# Patient Record
Sex: MALE | Race: BLACK OR AFRICAN AMERICAN | NOT HISPANIC OR LATINO | URBAN - METROPOLITAN AREA
[De-identification: names, ages, dates, MRNs, and addresses within clinical notes are randomized per-mention and may not be internally consistent; named-entity substitution may affect disease eponyms.]

---

## 2017-11-22 ENCOUNTER — EMERGENCY (EMERGENCY)
Facility: HOSPITAL | Age: 37
LOS: 1 days | Discharge: HOME | End: 2017-11-22
Attending: EMERGENCY MEDICINE
Payer: MEDICAID

## 2017-11-22 VITALS
DIASTOLIC BLOOD PRESSURE: 93 MMHG | WEIGHT: 300.05 LBS | SYSTOLIC BLOOD PRESSURE: 140 MMHG | OXYGEN SATURATION: 100 % | HEART RATE: 120 BPM | TEMPERATURE: 100 F | HEIGHT: 71 IN | RESPIRATION RATE: 20 BRPM

## 2017-11-22 VITALS
DIASTOLIC BLOOD PRESSURE: 67 MMHG | SYSTOLIC BLOOD PRESSURE: 145 MMHG | RESPIRATION RATE: 20 BRPM | HEART RATE: 101 BPM | OXYGEN SATURATION: 98 % | TEMPERATURE: 100 F

## 2017-11-22 DIAGNOSIS — Z96.651 PRESENCE OF RIGHT ARTIFICIAL KNEE JOINT: Chronic | ICD-10-CM

## 2017-11-22 LAB
ALBUMIN SERPL ELPH-MCNC: 3 G/DL — LOW (ref 3.5–5)
ALP SERPL-CCNC: 67 U/L — SIGNIFICANT CHANGE UP (ref 40–120)
ALT FLD-CCNC: 39 U/L DA — SIGNIFICANT CHANGE UP (ref 10–60)
ANION GAP SERPL CALC-SCNC: 6 MMOL/L — SIGNIFICANT CHANGE UP (ref 5–17)
AST SERPL-CCNC: 29 U/L — SIGNIFICANT CHANGE UP (ref 10–40)
BILIRUB SERPL-MCNC: 0.7 MG/DL — SIGNIFICANT CHANGE UP (ref 0.2–1.2)
BUN SERPL-MCNC: 15 MG/DL — SIGNIFICANT CHANGE UP (ref 7–18)
CALCIUM SERPL-MCNC: 9 MG/DL — SIGNIFICANT CHANGE UP (ref 8.4–10.5)
CHLORIDE SERPL-SCNC: 106 MMOL/L — SIGNIFICANT CHANGE UP (ref 96–108)
CO2 SERPL-SCNC: 27 MMOL/L — SIGNIFICANT CHANGE UP (ref 22–31)
CREAT SERPL-MCNC: 1.16 MG/DL — SIGNIFICANT CHANGE UP (ref 0.5–1.3)
D DIMER BLD IA.RAPID-MCNC: 1039 NG/ML DDU — HIGH
GLUCOSE SERPL-MCNC: 151 MG/DL — HIGH (ref 70–99)
HCT VFR BLD CALC: 36 % — LOW (ref 39–50)
HGB BLD-MCNC: 11.5 G/DL — LOW (ref 13–17)
MCHC RBC-ENTMCNC: 28.1 PG — SIGNIFICANT CHANGE UP (ref 27–34)
MCHC RBC-ENTMCNC: 31.9 GM/DL — LOW (ref 32–36)
MCV RBC AUTO: 88.1 FL — SIGNIFICANT CHANGE UP (ref 80–100)
NT-PROBNP SERPL-SCNC: 6 PG/ML — SIGNIFICANT CHANGE UP (ref 0–125)
PLATELET # BLD AUTO: 371 K/UL — SIGNIFICANT CHANGE UP (ref 150–400)
POTASSIUM SERPL-MCNC: 3.8 MMOL/L — SIGNIFICANT CHANGE UP (ref 3.5–5.3)
POTASSIUM SERPL-SCNC: 3.8 MMOL/L — SIGNIFICANT CHANGE UP (ref 3.5–5.3)
PROT SERPL-MCNC: 7.4 G/DL — SIGNIFICANT CHANGE UP (ref 6–8.3)
RBC # BLD: 4.08 M/UL — LOW (ref 4.2–5.8)
RBC # FLD: 13.1 % — SIGNIFICANT CHANGE UP (ref 10.3–14.5)
SODIUM SERPL-SCNC: 139 MMOL/L — SIGNIFICANT CHANGE UP (ref 135–145)
WBC # BLD: 8 K/UL — SIGNIFICANT CHANGE UP (ref 3.8–10.5)
WBC # FLD AUTO: 8 K/UL — SIGNIFICANT CHANGE UP (ref 3.8–10.5)

## 2017-11-22 PROCEDURE — 80053 COMPREHEN METABOLIC PANEL: CPT

## 2017-11-22 PROCEDURE — 83880 ASSAY OF NATRIURETIC PEPTIDE: CPT

## 2017-11-22 PROCEDURE — 93971 EXTREMITY STUDY: CPT

## 2017-11-22 PROCEDURE — 85379 FIBRIN DEGRADATION QUANT: CPT

## 2017-11-22 PROCEDURE — 93005 ELECTROCARDIOGRAM TRACING: CPT

## 2017-11-22 PROCEDURE — 84484 ASSAY OF TROPONIN QUANT: CPT

## 2017-11-22 PROCEDURE — 82550 ASSAY OF CK (CPK): CPT

## 2017-11-22 PROCEDURE — 85027 COMPLETE CBC AUTOMATED: CPT

## 2017-11-22 PROCEDURE — 71275 CT ANGIOGRAPHY CHEST: CPT | Mod: 26

## 2017-11-22 PROCEDURE — 71275 CT ANGIOGRAPHY CHEST: CPT

## 2017-11-22 PROCEDURE — 93971 EXTREMITY STUDY: CPT | Mod: 26,RT

## 2017-11-22 PROCEDURE — 82553 CREATINE MB FRACTION: CPT

## 2017-11-22 PROCEDURE — 96372 THER/PROPH/DIAG INJ SC/IM: CPT

## 2017-11-22 PROCEDURE — 99285 EMERGENCY DEPT VISIT HI MDM: CPT

## 2017-11-22 PROCEDURE — 99284 EMERGENCY DEPT VISIT MOD MDM: CPT | Mod: 25

## 2017-11-22 RX ORDER — OXYCODONE AND ACETAMINOPHEN 5; 325 MG/1; MG/1
1 TABLET ORAL ONCE
Qty: 0 | Refills: 0 | Status: DISCONTINUED | OUTPATIENT
Start: 2017-11-22 | End: 2017-11-22

## 2017-11-22 RX ORDER — CHLORPROMAZINE HCL 10 MG
1 TABLET ORAL
Qty: 9 | Refills: 0 | OUTPATIENT
Start: 2017-11-22 | End: 2017-11-25

## 2017-11-22 RX ORDER — CHLORPROMAZINE HCL 10 MG
25 TABLET ORAL ONCE
Qty: 0 | Refills: 0 | Status: COMPLETED | OUTPATIENT
Start: 2017-11-22 | End: 2017-11-22

## 2017-11-22 RX ORDER — ACETAMINOPHEN 500 MG
975 TABLET ORAL ONCE
Qty: 0 | Refills: 0 | Status: COMPLETED | OUTPATIENT
Start: 2017-11-22 | End: 2017-11-22

## 2017-11-22 RX ORDER — CHLORPROMAZINE HCL 10 MG
50 TABLET ORAL ONCE
Qty: 0 | Refills: 0 | Status: COMPLETED | OUTPATIENT
Start: 2017-11-22 | End: 2017-11-22

## 2017-11-22 RX ORDER — CHLORPROMAZINE HCL 10 MG
10 TABLET ORAL ONCE
Qty: 0 | Refills: 0 | Status: COMPLETED | OUTPATIENT
Start: 2017-11-22 | End: 2017-11-22

## 2017-11-22 RX ORDER — ENOXAPARIN SODIUM 100 MG/ML
130 INJECTION SUBCUTANEOUS ONCE
Qty: 0 | Refills: 0 | Status: COMPLETED | OUTPATIENT
Start: 2017-11-22 | End: 2017-11-22

## 2017-11-22 RX ADMIN — Medication 10 MILLIGRAM(S): at 18:18

## 2017-11-22 RX ADMIN — Medication 50 MILLIGRAM(S): at 22:28

## 2017-11-22 RX ADMIN — Medication 25 MILLIGRAM(S): at 19:35

## 2017-11-22 RX ADMIN — Medication 975 MILLIGRAM(S): at 20:59

## 2017-11-22 RX ADMIN — OXYCODONE AND ACETAMINOPHEN 1 TABLET(S): 5; 325 TABLET ORAL at 18:06

## 2017-11-22 RX ADMIN — Medication 975 MILLIGRAM(S): at 22:27

## 2017-11-22 RX ADMIN — ENOXAPARIN SODIUM 130 MILLIGRAM(S): 100 INJECTION SUBCUTANEOUS at 18:58

## 2017-11-22 RX ADMIN — OXYCODONE AND ACETAMINOPHEN 1 TABLET(S): 5; 325 TABLET ORAL at 18:45

## 2017-11-22 NOTE — ED PROVIDER NOTE - CONTEXT
Donnell Dub 35   Date:   12/2/2019     Name:   Emmanuel Slaughter    YOB: 1950   MRN:   [id number]       WHERE IS YOUR PAIN NOW? Sundeep the areas on your body where you feel the described sensations.   Use the appropriate exertion

## 2017-11-22 NOTE — ED PROVIDER NOTE - OBJECTIVE STATEMENT
36 M h/o obesity, osteoarthritis s/p right knee replacement 5 days ago came with dyspnea for last 2-3 days. Dyspnea was gradual in onset, progressive, worsens with exertion, associated with chest pressure, hiccups and regurgitations. Patient was discharged 3 days after surgery, he was ambulating with walker. Patient denies calf pain, cough, fever, nausea, vomiting, diarrhea, rash, fall, trauma, smoking, alcohol abuse and illicit drug use.

## 2017-11-22 NOTE — ED PROVIDER NOTE - ATTENDING CONTRIBUTION TO CARE
pt comes in with sob today s/p knee replacement , ct angio and labs    ct negative, febrile discussed with Dr Ren , will follow up with patient

## 2017-11-22 NOTE — ED ADULT NURSE NOTE - OBJECTIVE STATEMENT
pt is here for difficulty breathing.  c/o stomach pain 10/10 which was worst one but no pain at this time, denied N/V, no fever.  pt state "I had total right knee replacement since last Friday".

## 2019-01-03 PROBLEM — Z00.00 ENCOUNTER FOR PREVENTIVE HEALTH EXAMINATION: Status: ACTIVE | Noted: 2019-01-03

## 2019-01-15 ENCOUNTER — TRANSCRIPTION ENCOUNTER (OUTPATIENT)
Age: 39
End: 2019-01-15

## 2019-01-15 ENCOUNTER — APPOINTMENT (OUTPATIENT)
Dept: ORTHOPEDIC SURGERY | Facility: CLINIC | Age: 39
End: 2019-01-15
Payer: MEDICAID

## 2019-01-15 VITALS
HEIGHT: 72 IN | SYSTOLIC BLOOD PRESSURE: 149 MMHG | BODY MASS INDEX: 37.93 KG/M2 | DIASTOLIC BLOOD PRESSURE: 86 MMHG | WEIGHT: 280 LBS | HEART RATE: 71 BPM

## 2019-01-15 DIAGNOSIS — Q65.89 OTHER SPECIFIED CONGENITAL DEFORMITIES OF HIP: ICD-10-CM

## 2019-01-15 DIAGNOSIS — M17.12 UNILATERAL PRIMARY OSTEOARTHRITIS, LEFT KNEE: ICD-10-CM

## 2019-01-15 DIAGNOSIS — M25.562 PAIN IN LEFT KNEE: ICD-10-CM

## 2019-01-15 PROCEDURE — 99204 OFFICE O/P NEW MOD 45 MIN: CPT

## 2019-01-15 PROCEDURE — 73564 X-RAY EXAM KNEE 4 OR MORE: CPT | Mod: RT

## 2019-01-15 PROCEDURE — 73522 X-RAY EXAM HIPS BI 3-4 VIEWS: CPT

## 2019-01-15 NOTE — DISCUSSION/SUMMARY
[de-identified] : Long discussion was had with the patient I feel he would benefit from left total knee replacement.  Prior to procedure we will get his notes from [facility] find out why he had hiccups.  Also we will be aware of in the postoperative.  We will find out the anesthesia and I suggested he be seen as an anesthesia consult before the surgery.  Surgical risks.    A  long discussion was had with the patient as what the total joint replacement would entail.  A model was used to demonstrate the operation.  The hospitalization and rehabilitation were discussed.  He has a perioperative antibiotics and DVT prophylaxis were discussed.   The risks, benefits and alternatives to surgical intervention were discussed at length with the patient. Specific risks discussed included: infection, wound breakdown, numbness and damage to nerves, tendon, muscle, arteries or other blood vessels.  The possibility of recurrent pain, no improvement in pain and actual worsening of the pain were also mentioned in conversation with the patient. Medical complications related to the patient's general medical health including deep vein thrombosis, pulmonary embolus, heart attack, stroke, death and other complications from anesthesia were discussed as well. The patient was told that we will take steps to minimize these risks by using sterile technique, antibiotics and DVT prophylaxis when appropriate and following the patient postoperatively in the clinic setting to monitor progress. The benefits of surgery were discussed with the patient including the potential to improve the current clinical condition throughout operative intervention. Alternatives to surgical intervention include continued conservative management which may yield less than optimal results this particular patient. All questions were answered to the satisfaction of the patient.  The patient also has degenerative arthritis in his left hip and is dysplastic of the left hip which is symptomatic more than the right.  At this time will be followed conservatively.

## 2019-01-15 NOTE — PHYSICAL EXAM
[de-identified] : Constitutional - the patient is of normal build and overweight.  BMI is 38.  The patient remains oriented to person, time, and  place.  Mood is normal. Vital signs as recorded are within normal limits.  The patients gait is with a limp off his left leg mainly from his left knee but also he says some pain from his left hip.. The patient has satisfactory  balance and can to  easily walk on toes and heels.\par \par The patient has no difficulty with respiration. Respiration at rest is a normal rate. The patient is not short of breath and has not become short of breath with short ambulation. There is no audible wheezing. No coughing.  Did have a problem after his total knee replacement where several days later he had severe  hiccuping which caused his diaphragm to occasionally slow.  He was admitted.\par \par Skin is normal for the patient's age. There are no abnormal masses or lymph nodes which stand out in the lower extremities.\par \par Spine - deep tendon reflexes are symmetric\par \par \par UPPER EXTREMITIES \par \par Shoulders ROM  is symmetric  and the motion is satisfactory.  There is no significant shoulder pain or limitation in motion which would make using a cane or a walker difficult. Shoulder stability and  strength are satisfactory.\par \par Circulation appears satisfactory with pedal pulses present.  There is no major edema in the lower legs. No skin tenderness or increased temperature. No major varicosities.\par \par HIP EXAMINATION\par \par Hip motion shows flexion right hip 130 left hip 100, adduction right hip 70 left hip 20, adduction right hip 35 left hip 30, external rotation right hip 70 left hip 10 internal rotation right hip 0 left hip 30.  He has some discomfort in his left hip.  There is no crepitus in either hip.  He does hold his left leg in slight external rotation.\par \par \par KNEE EXAMINATION\par Right total knee replacement goes from 0-120 his left knee goes from 0-110.  He has pain in the medial joint line a varus alignment to his left knee he has medial lateral jog of motion but good endpoints.  He has crepitus behind his patella pain over his medial joint line and patella no Baker's cyst and a small effusion.\par \par \par \par Ankle and foot examination\par Of the ankle has normal motion.  There is normal ankle stability.  The patient has no major abnormalities of the foot.\par \par \par \par  [de-identified] : His right knee is a total knee replacement seen on the AP view is in good position there is some decreased bone density around the knee on the tibia side.  His left knee bone against bone contact medial medial and lateral large osteophytes around the periphery and severe patellofemoral arthritis.  Impression left knee severe arthritis.\par \par AP of the pelvis and lateral both the right and the left hips show he has bilateral with some decrease superior covering more on the left than the right the femoral heads are generally round and joint but he has lateral pseudosubluxation again more of the left hip than the right and the left hip is held more in external rotation.  The left hip probably has CDH with degenerative arthritic changes.

## 2019-01-15 NOTE — HISTORY OF PRESENT ILLNESS
[de-identified] : Pt is a 37 y/o male c/o left knee pain for many years getting progressively worse.  He states that he has OCD.  He has not had any treatment for this.  He has pain when he walks and goes up and down stairs.  It is painful to stand for a long period of time.  He has pain after a long day on his feet.  The knee swells.  The knee christopher occasionally.  Denies locking, numbness or tingling.  He is not taking anything for pain.  He is not currently working because of the pain.\par He is s/p R TKR by Dr. Ren 11/17/17 which is doing well.

## 2019-02-04 ENCOUNTER — OUTPATIENT (OUTPATIENT)
Dept: OUTPATIENT SERVICES | Facility: HOSPITAL | Age: 39
LOS: 1 days | End: 2019-02-04
Payer: MEDICAID

## 2019-02-04 VITALS
DIASTOLIC BLOOD PRESSURE: 89 MMHG | RESPIRATION RATE: 16 BRPM | WEIGHT: 287.92 LBS | HEIGHT: 71 IN | HEART RATE: 66 BPM | SYSTOLIC BLOOD PRESSURE: 144 MMHG | OXYGEN SATURATION: 99 % | TEMPERATURE: 99 F

## 2019-02-04 DIAGNOSIS — Z01.84 ENCOUNTER FOR ANTIBODY RESPONSE EXAMINATION: ICD-10-CM

## 2019-02-04 DIAGNOSIS — M17.12 UNILATERAL PRIMARY OSTEOARTHRITIS, LEFT KNEE: ICD-10-CM

## 2019-02-04 DIAGNOSIS — Z96.651 PRESENCE OF RIGHT ARTIFICIAL KNEE JOINT: Chronic | ICD-10-CM

## 2019-02-04 DIAGNOSIS — Z29.9 ENCOUNTER FOR PROPHYLACTIC MEASURES, UNSPECIFIED: ICD-10-CM

## 2019-02-04 DIAGNOSIS — M93.90 OSTEOCHONDROPATHY, UNSPECIFIED OF UNSPECIFIED SITE: ICD-10-CM

## 2019-02-04 LAB
ANION GAP SERPL CALC-SCNC: 13 MMOL/L — SIGNIFICANT CHANGE UP (ref 5–17)
BLD GP AB SCN SERPL QL: NEGATIVE — SIGNIFICANT CHANGE UP
BUN SERPL-MCNC: 12 MG/DL — SIGNIFICANT CHANGE UP (ref 7–23)
CALCIUM SERPL-MCNC: 9.5 MG/DL — SIGNIFICANT CHANGE UP (ref 8.4–10.5)
CHLORIDE SERPL-SCNC: 102 MMOL/L — SIGNIFICANT CHANGE UP (ref 96–108)
CO2 SERPL-SCNC: 24 MMOL/L — SIGNIFICANT CHANGE UP (ref 22–31)
CREAT SERPL-MCNC: 1.14 MG/DL — SIGNIFICANT CHANGE UP (ref 0.5–1.3)
GLUCOSE SERPL-MCNC: 104 MG/DL — HIGH (ref 70–99)
HBA1C BLD-MCNC: 6.1 % — HIGH (ref 4–5.6)
HCT VFR BLD CALC: 46.7 % — SIGNIFICANT CHANGE UP (ref 39–50)
HGB BLD-MCNC: 15.6 G/DL — SIGNIFICANT CHANGE UP (ref 13–17)
MCHC RBC-ENTMCNC: 28.1 PG — SIGNIFICANT CHANGE UP (ref 27–34)
MCHC RBC-ENTMCNC: 33.4 GM/DL — SIGNIFICANT CHANGE UP (ref 32–36)
MCV RBC AUTO: 84.1 FL — SIGNIFICANT CHANGE UP (ref 80–100)
MRSA PCR RESULT.: SIGNIFICANT CHANGE UP
PLATELET # BLD AUTO: 256 K/UL — SIGNIFICANT CHANGE UP (ref 150–400)
POTASSIUM SERPL-MCNC: 4.1 MMOL/L — SIGNIFICANT CHANGE UP (ref 3.5–5.3)
POTASSIUM SERPL-SCNC: 4.1 MMOL/L — SIGNIFICANT CHANGE UP (ref 3.5–5.3)
RBC # BLD: 5.55 M/UL — SIGNIFICANT CHANGE UP (ref 4.2–5.8)
RBC # FLD: 14.1 % — SIGNIFICANT CHANGE UP (ref 10.3–14.5)
RH IG SCN BLD-IMP: POSITIVE — SIGNIFICANT CHANGE UP
S AUREUS DNA NOSE QL NAA+PROBE: DETECTED
SODIUM SERPL-SCNC: 139 MMOL/L — SIGNIFICANT CHANGE UP (ref 135–145)
WBC # BLD: 5.12 K/UL — SIGNIFICANT CHANGE UP (ref 3.8–10.5)
WBC # FLD AUTO: 5.12 K/UL — SIGNIFICANT CHANGE UP (ref 3.8–10.5)

## 2019-02-04 PROCEDURE — 83036 HEMOGLOBIN GLYCOSYLATED A1C: CPT

## 2019-02-04 PROCEDURE — 86850 RBC ANTIBODY SCREEN: CPT

## 2019-02-04 PROCEDURE — 87641 MR-STAPH DNA AMP PROBE: CPT

## 2019-02-04 PROCEDURE — 80048 BASIC METABOLIC PNL TOTAL CA: CPT

## 2019-02-04 PROCEDURE — 86900 BLOOD TYPING SEROLOGIC ABO: CPT

## 2019-02-04 PROCEDURE — G0463: CPT

## 2019-02-04 PROCEDURE — 87640 STAPH A DNA AMP PROBE: CPT

## 2019-02-04 PROCEDURE — 86901 BLOOD TYPING SEROLOGIC RH(D): CPT

## 2019-02-04 PROCEDURE — 85027 COMPLETE CBC AUTOMATED: CPT

## 2019-02-04 RX ORDER — SODIUM CHLORIDE 9 MG/ML
3 INJECTION INTRAMUSCULAR; INTRAVENOUS; SUBCUTANEOUS EVERY 8 HOURS
Qty: 0 | Refills: 0 | Status: DISCONTINUED | OUTPATIENT
Start: 2019-02-18 | End: 2019-02-18

## 2019-02-04 RX ORDER — CEFAZOLIN SODIUM 1 G
3000 VIAL (EA) INJECTION ONCE
Qty: 0 | Refills: 0 | Status: DISCONTINUED | OUTPATIENT
Start: 2019-02-18 | End: 2019-02-19

## 2019-02-04 RX ORDER — TRAMADOL HYDROCHLORIDE 50 MG/1
50 TABLET ORAL ONCE
Qty: 0 | Refills: 0 | Status: DISCONTINUED | OUTPATIENT
Start: 2019-02-18 | End: 2019-02-18

## 2019-02-04 NOTE — H&P PST ADULT - NSANTHOSAYNRD_GEN_A_CORE
No. HAMLET screening performed.  STOP BANG Legend: 0-2 = LOW Risk; 3-4 = INTERMEDIATE Risk; 5-8 = HIGH Risk

## 2019-02-04 NOTE — H&P PST ADULT - MUSCULOSKELETAL
details… detailed exam normal strength/decreased ROM due to pain/no joint warmth/no joint erythema/no calf tenderness

## 2019-02-04 NOTE — H&P PST ADULT - PROBLEM SELECTOR PLAN 1
Left Total Knee Replacement   Medical and Cardiac Eval next visit 2/11/19  [facility] medical records : pending

## 2019-02-04 NOTE — H&P PST ADULT - ASSESSMENT
SIVANI VTE 2.0 SCORE [CLOT updated 2019]    AGE RELATED RISK FACTORS                                                       MOBILITY RELATED FACTORS  [x ] Age 41-60 years                                            (1 Point)                    [ ] Bed rest                                                        (1 Point)  [ ] Age: 61-74 years                                           (2 Points)                  [ ] Plaster cast                                                   (2 Points)  [ ] Age= 75 years                                              (3 Points)                    [ ] Bed bound for more than 72 hours                 (2 Points)    DISEASE RELATED RISK FACTORS                                               GENDER SPECIFIC FACTORS  [ ] Edema in the lower extremities                       (1 Point)              [ ] Pregnancy                                                     (1 Point)  [ ] Varicose veins                                               (1 Point)                     [ ] Post-partum < 6 weeks                                   (1 Point)             [ x] BMI > 25 Kg/m2                                            (1 Point)                     [ ] Hormonal therapy  or oral contraception          (1 Point)                 [ ] Sepsis (in the previous month)                        (1 Point)               [ ] History of pregnancy complications                 (1 point)  [ ] Pneumonia or serious lung disease                                               [ ] Unexplained or recurrent                     (1 Point)           (in the previous month)                               (1 Point)  [ ] Abnormal pulmonary function test                     (1 Point)                 SURGERY RELATED RISK FACTORS  [ ] Acute myocardial infarction                              (1 Point)               [ ]  Section                                             (1 Point)  [ ] Congestive heart failure (in the previous month)  (1 Point)      [ ] Minor surgery                                                  (1 Point)   [ ] Inflammatory bowel disease                             (1 Point)               [ ] Arthroscopic surgery                                        (2 Points)  [ ] Central venous access                                      (2 Points)                [ x] General surgery lasting more than 45 minutes (2 points)  [ ] Malignancy- Present or previous                   (2 Points)                [ ] Elective arthroplasty                                         (5 points)    [ ] Stroke (in the previous month)                          (5 Points)                                                                                                                                                           HEMATOLOGY RELATED FACTORS                                                 TRAUMA RELATED RISK FACTORS  [ ] Prior episodes of VTE                                     (3 Points)                [ ] Fracture of the hip, pelvis, or leg                       (5 Points)  [ ] Positive family history for VTE                         (3 Points)             [ ] Acute spinal cord injury (in the previous month)  (5 Points)  [ ] Prothrombin 08783 A                                     (3 Points)               [ ] Paralysis  (less than 1 month)                             (5 Points)  [ ] Factor V Leiden                                             (3 Points)                  [ ] Multiple Trauma within 1 month                        (5 Points)  [ ] Lupus anticoagulants                                     (3 Points)                                                           [ ] Anticardiolipin antibodies                               (3 Points)                                                       [ ] High homocysteine in the blood                      (3 Points)                                             [ ] Other congenital or acquired thrombophilia      (3 Points)                                                [ ] Heparin induced thrombocytopenia                  (3 Points)                                     Total Score [      4    ]

## 2019-02-04 NOTE — H&P PST ADULT - HISTORY OF PRESENT ILLNESS
38 years old male H/O Morbid obesity (BMI 40), Prediabetes,  Hyperlipemia, Osteochondritis s/p RTKR on 11/17/17 at [facility]. After discharge 11/22/17 he went ot [facility] ER for c/o hiccups, dyspnia worsening on exertion associated with chest pressure. PE workup  done and was negative, he was discharged home same day. His symptoms did not improved and on 11/23/17 he was admitted to  [facility] ICU care as per pt he staid in the hospital for a few days and was discharged  home in good health without clear diagnosis. Pt presents to PST for scheduled Left Total Knee Replacement on 2/18/19 for c/o Left knee pain due to  known osteoarthritis.  Denies any chest pain, palpitation, SOB, N/V, fever or chills.

## 2019-02-04 NOTE — H&P PST ADULT - NEGATIVE CARDIOVASCULAR SYMPTOMS
no paroxysmal nocturnal dyspnea/no chest pain/no dyspnea on exertion/no orthopnea/no peripheral edema/no palpitations

## 2019-02-04 NOTE — H&P PST ADULT - PMH
Hyperlipemia    Morbid obesity with BMI of 40.0-44.9, adult    Osteochondritis    Prediabetes    Sciatica of left side

## 2019-02-17 ENCOUNTER — TRANSCRIPTION ENCOUNTER (OUTPATIENT)
Age: 39
End: 2019-02-17

## 2019-02-18 ENCOUNTER — APPOINTMENT (OUTPATIENT)
Dept: ORTHOPEDIC SURGERY | Facility: HOSPITAL | Age: 39
End: 2019-02-18

## 2019-02-18 ENCOUNTER — INPATIENT (INPATIENT)
Facility: HOSPITAL | Age: 39
LOS: 0 days | Discharge: HOME | DRG: 470 | End: 2019-02-19
Attending: ORTHOPAEDIC SURGERY | Admitting: ORTHOPAEDIC SURGERY
Payer: MEDICAID

## 2019-02-18 ENCOUNTER — TRANSCRIPTION ENCOUNTER (OUTPATIENT)
Age: 39
End: 2019-02-18

## 2019-02-18 ENCOUNTER — RESULT REVIEW (OUTPATIENT)
Age: 39
End: 2019-02-18

## 2019-02-18 VITALS
OXYGEN SATURATION: 97 % | RESPIRATION RATE: 18 BRPM | HEART RATE: 73 BPM | HEIGHT: 71 IN | TEMPERATURE: 98 F | WEIGHT: 287.92 LBS | DIASTOLIC BLOOD PRESSURE: 88 MMHG | SYSTOLIC BLOOD PRESSURE: 147 MMHG

## 2019-02-18 DIAGNOSIS — Z96.651 PRESENCE OF RIGHT ARTIFICIAL KNEE JOINT: Chronic | ICD-10-CM

## 2019-02-18 DIAGNOSIS — M17.12 UNILATERAL PRIMARY OSTEOARTHRITIS, LEFT KNEE: ICD-10-CM

## 2019-02-18 LAB
ANION GAP SERPL CALC-SCNC: 15 MMOL/L — SIGNIFICANT CHANGE UP (ref 5–17)
BUN SERPL-MCNC: 11 MG/DL — SIGNIFICANT CHANGE UP (ref 7–23)
CALCIUM SERPL-MCNC: 8.8 MG/DL — SIGNIFICANT CHANGE UP (ref 8.4–10.5)
CHLORIDE SERPL-SCNC: 102 MMOL/L — SIGNIFICANT CHANGE UP (ref 96–108)
CO2 SERPL-SCNC: 21 MMOL/L — LOW (ref 22–31)
CREAT SERPL-MCNC: 0.98 MG/DL — SIGNIFICANT CHANGE UP (ref 0.5–1.3)
GLUCOSE BLDC GLUCOMTR-MCNC: 113 MG/DL — HIGH (ref 70–99)
GLUCOSE BLDC GLUCOMTR-MCNC: 147 MG/DL — HIGH (ref 70–99)
GLUCOSE BLDC GLUCOMTR-MCNC: 150 MG/DL — HIGH (ref 70–99)
GLUCOSE BLDC GLUCOMTR-MCNC: 186 MG/DL — HIGH (ref 70–99)
GLUCOSE SERPL-MCNC: 164 MG/DL — HIGH (ref 70–99)
HCT VFR BLD CALC: 46.6 % — SIGNIFICANT CHANGE UP (ref 39–50)
HGB BLD-MCNC: 15.9 G/DL — SIGNIFICANT CHANGE UP (ref 13–17)
MCHC RBC-ENTMCNC: 29.4 PG — SIGNIFICANT CHANGE UP (ref 27–34)
MCHC RBC-ENTMCNC: 34.2 GM/DL — SIGNIFICANT CHANGE UP (ref 32–36)
MCV RBC AUTO: 86.1 FL — SIGNIFICANT CHANGE UP (ref 80–100)
PLATELET # BLD AUTO: 254 K/UL — SIGNIFICANT CHANGE UP (ref 150–400)
POTASSIUM SERPL-MCNC: 4.5 MMOL/L — SIGNIFICANT CHANGE UP (ref 3.5–5.3)
POTASSIUM SERPL-SCNC: 4.5 MMOL/L — SIGNIFICANT CHANGE UP (ref 3.5–5.3)
RBC # BLD: 5.41 M/UL — SIGNIFICANT CHANGE UP (ref 4.2–5.8)
RBC # FLD: 12.7 % — SIGNIFICANT CHANGE UP (ref 10.3–14.5)
RH IG SCN BLD-IMP: POSITIVE — SIGNIFICANT CHANGE UP
SODIUM SERPL-SCNC: 138 MMOL/L — SIGNIFICANT CHANGE UP (ref 135–145)
WBC # BLD: 7.6 K/UL — SIGNIFICANT CHANGE UP (ref 3.8–10.5)
WBC # FLD AUTO: 7.6 K/UL — SIGNIFICANT CHANGE UP (ref 3.8–10.5)

## 2019-02-18 PROCEDURE — 27447 TOTAL KNEE ARTHROPLASTY: CPT | Mod: LT

## 2019-02-18 PROCEDURE — 88311 DECALCIFY TISSUE: CPT | Mod: 26

## 2019-02-18 PROCEDURE — 73560 X-RAY EXAM OF KNEE 1 OR 2: CPT | Mod: 26,LT

## 2019-02-18 PROCEDURE — 88305 TISSUE EXAM BY PATHOLOGIST: CPT | Mod: 26

## 2019-02-18 RX ORDER — GLUCAGON INJECTION, SOLUTION 0.5 MG/.1ML
1 INJECTION, SOLUTION SUBCUTANEOUS ONCE
Qty: 0 | Refills: 0 | Status: DISCONTINUED | OUTPATIENT
Start: 2019-02-18 | End: 2019-02-19

## 2019-02-18 RX ORDER — OXYCODONE HYDROCHLORIDE 5 MG/1
10 TABLET ORAL EVERY 4 HOURS
Qty: 0 | Refills: 0 | Status: DISCONTINUED | OUTPATIENT
Start: 2019-02-18 | End: 2019-02-19

## 2019-02-18 RX ORDER — MAGNESIUM HYDROXIDE 400 MG/1
30 TABLET, CHEWABLE ORAL DAILY
Qty: 0 | Refills: 0 | Status: DISCONTINUED | OUTPATIENT
Start: 2019-02-18 | End: 2019-02-19

## 2019-02-18 RX ORDER — DEXTROSE 50 % IN WATER 50 %
25 SYRINGE (ML) INTRAVENOUS ONCE
Qty: 0 | Refills: 0 | Status: DISCONTINUED | OUTPATIENT
Start: 2019-02-18 | End: 2019-02-19

## 2019-02-18 RX ORDER — DOCUSATE SODIUM 100 MG
1 CAPSULE ORAL
Qty: 0 | Refills: 0 | COMMUNITY
Start: 2019-02-18

## 2019-02-18 RX ORDER — PANTOPRAZOLE SODIUM 20 MG/1
40 TABLET, DELAYED RELEASE ORAL ONCE
Qty: 0 | Refills: 0 | Status: COMPLETED | OUTPATIENT
Start: 2019-02-18 | End: 2019-02-18

## 2019-02-18 RX ORDER — SIMVASTATIN 20 MG/1
10 TABLET, FILM COATED ORAL AT BEDTIME
Qty: 0 | Refills: 0 | Status: DISCONTINUED | OUTPATIENT
Start: 2019-02-18 | End: 2019-02-19

## 2019-02-18 RX ORDER — SODIUM CHLORIDE 9 MG/ML
1000 INJECTION, SOLUTION INTRAVENOUS
Qty: 0 | Refills: 0 | Status: DISCONTINUED | OUTPATIENT
Start: 2019-02-18 | End: 2019-02-18

## 2019-02-18 RX ORDER — SODIUM CHLORIDE 9 MG/ML
500 INJECTION INTRAMUSCULAR; INTRAVENOUS; SUBCUTANEOUS ONCE
Qty: 0 | Refills: 0 | Status: COMPLETED | OUTPATIENT
Start: 2019-02-18 | End: 2019-02-18

## 2019-02-18 RX ORDER — CELECOXIB 200 MG/1
200 CAPSULE ORAL EVERY 12 HOURS
Qty: 0 | Refills: 0 | Status: DISCONTINUED | OUTPATIENT
Start: 2019-02-20 | End: 2019-02-19

## 2019-02-18 RX ORDER — LISINOPRIL 2.5 MG/1
1 TABLET ORAL
Qty: 0 | Refills: 0 | COMMUNITY

## 2019-02-18 RX ORDER — PANTOPRAZOLE SODIUM 20 MG/1
40 TABLET, DELAYED RELEASE ORAL
Qty: 0 | Refills: 0 | Status: DISCONTINUED | OUTPATIENT
Start: 2019-02-18 | End: 2019-02-19

## 2019-02-18 RX ORDER — POLYETHYLENE GLYCOL 3350 17 G/17G
17 POWDER, FOR SOLUTION ORAL DAILY
Qty: 0 | Refills: 0 | Status: DISCONTINUED | OUTPATIENT
Start: 2019-02-18 | End: 2019-02-19

## 2019-02-18 RX ORDER — TRAMADOL HYDROCHLORIDE 50 MG/1
50 TABLET ORAL EVERY 6 HOURS
Qty: 0 | Refills: 0 | Status: DISCONTINUED | OUTPATIENT
Start: 2019-02-18 | End: 2019-02-19

## 2019-02-18 RX ORDER — SIMVASTATIN 20 MG/1
1 TABLET, FILM COATED ORAL
Qty: 0 | Refills: 0 | COMMUNITY

## 2019-02-18 RX ORDER — SODIUM CHLORIDE 9 MG/ML
500 INJECTION INTRAMUSCULAR; INTRAVENOUS; SUBCUTANEOUS ONCE
Qty: 0 | Refills: 0 | Status: COMPLETED | OUTPATIENT
Start: 2019-02-19 | End: 2019-02-19

## 2019-02-18 RX ORDER — POLYETHYLENE GLYCOL 3350 17 G/17G
17 POWDER, FOR SOLUTION ORAL
Qty: 0 | Refills: 0 | COMMUNITY
Start: 2019-02-18

## 2019-02-18 RX ORDER — DEXTROSE 50 % IN WATER 50 %
15 SYRINGE (ML) INTRAVENOUS ONCE
Qty: 0 | Refills: 0 | Status: DISCONTINUED | OUTPATIENT
Start: 2019-02-18 | End: 2019-02-19

## 2019-02-18 RX ORDER — ACETAMINOPHEN 500 MG
975 TABLET ORAL EVERY 8 HOURS
Qty: 0 | Refills: 0 | Status: DISCONTINUED | OUTPATIENT
Start: 2019-02-19 | End: 2019-02-19

## 2019-02-18 RX ORDER — OXYCODONE HYDROCHLORIDE 5 MG/1
5 TABLET ORAL EVERY 4 HOURS
Qty: 0 | Refills: 0 | Status: DISCONTINUED | OUTPATIENT
Start: 2019-02-18 | End: 2019-02-19

## 2019-02-18 RX ORDER — ACETAMINOPHEN 500 MG
975 TABLET ORAL ONCE
Qty: 0 | Refills: 0 | Status: COMPLETED | OUTPATIENT
Start: 2019-02-18 | End: 2019-02-18

## 2019-02-18 RX ORDER — DEXTROSE 50 % IN WATER 50 %
12.5 SYRINGE (ML) INTRAVENOUS ONCE
Qty: 0 | Refills: 0 | Status: DISCONTINUED | OUTPATIENT
Start: 2019-02-18 | End: 2019-02-19

## 2019-02-18 RX ORDER — ACETAMINOPHEN 500 MG
3 TABLET ORAL
Qty: 0 | Refills: 0 | COMMUNITY
Start: 2019-02-18

## 2019-02-18 RX ORDER — GABAPENTIN 400 MG/1
300 CAPSULE ORAL ONCE
Qty: 0 | Refills: 0 | Status: COMPLETED | OUTPATIENT
Start: 2019-02-18 | End: 2019-02-18

## 2019-02-18 RX ORDER — ONDANSETRON 8 MG/1
4 TABLET, FILM COATED ORAL EVERY 6 HOURS
Qty: 0 | Refills: 0 | Status: DISCONTINUED | OUTPATIENT
Start: 2019-02-18 | End: 2019-02-19

## 2019-02-18 RX ORDER — LISINOPRIL 2.5 MG/1
2.5 TABLET ORAL DAILY
Qty: 0 | Refills: 0 | Status: DISCONTINUED | OUTPATIENT
Start: 2019-02-18 | End: 2019-02-19

## 2019-02-18 RX ORDER — FOLIC ACID 0.8 MG
1 TABLET ORAL DAILY
Qty: 0 | Refills: 0 | Status: DISCONTINUED | OUTPATIENT
Start: 2019-02-18 | End: 2019-02-19

## 2019-02-18 RX ORDER — CHOLECALCIFEROL (VITAMIN D3) 125 MCG
1 CAPSULE ORAL
Qty: 0 | Refills: 0 | COMMUNITY

## 2019-02-18 RX ORDER — ACETAMINOPHEN 500 MG
1000 TABLET ORAL ONCE
Qty: 0 | Refills: 0 | Status: COMPLETED | OUTPATIENT
Start: 2019-02-19 | End: 2019-02-19

## 2019-02-18 RX ORDER — HYDROMORPHONE HYDROCHLORIDE 2 MG/ML
0.5 INJECTION INTRAMUSCULAR; INTRAVENOUS; SUBCUTANEOUS
Qty: 0 | Refills: 0 | Status: DISCONTINUED | OUTPATIENT
Start: 2019-02-18 | End: 2019-02-18

## 2019-02-18 RX ORDER — ASPIRIN/CALCIUM CARB/MAGNESIUM 324 MG
325 TABLET ORAL
Qty: 0 | Refills: 0 | Status: DISCONTINUED | OUTPATIENT
Start: 2019-02-18 | End: 2019-02-19

## 2019-02-18 RX ORDER — ASPIRIN/CALCIUM CARB/MAGNESIUM 324 MG
1 TABLET ORAL
Qty: 0 | Refills: 0 | COMMUNITY
Start: 2019-02-18

## 2019-02-18 RX ORDER — ACETAMINOPHEN 500 MG
1000 TABLET ORAL ONCE
Qty: 0 | Refills: 0 | Status: COMPLETED | OUTPATIENT
Start: 2019-02-18 | End: 2019-02-18

## 2019-02-18 RX ORDER — DOCUSATE SODIUM 100 MG
100 CAPSULE ORAL THREE TIMES A DAY
Qty: 0 | Refills: 0 | Status: DISCONTINUED | OUTPATIENT
Start: 2019-02-18 | End: 2019-02-19

## 2019-02-18 RX ORDER — SODIUM CHLORIDE 9 MG/ML
1000 INJECTION INTRAMUSCULAR; INTRAVENOUS; SUBCUTANEOUS
Qty: 0 | Refills: 0 | Status: DISCONTINUED | OUTPATIENT
Start: 2019-02-18 | End: 2019-02-19

## 2019-02-18 RX ORDER — METFORMIN HYDROCHLORIDE 850 MG/1
1 TABLET ORAL
Qty: 0 | Refills: 0 | COMMUNITY

## 2019-02-18 RX ORDER — LANOLIN ALCOHOL/MO/W.PET/CERES
3 CREAM (GRAM) TOPICAL AT BEDTIME
Qty: 0 | Refills: 0 | Status: DISCONTINUED | OUTPATIENT
Start: 2019-02-18 | End: 2019-02-19

## 2019-02-18 RX ORDER — FERROUS SULFATE 325(65) MG
325 TABLET ORAL
Qty: 0 | Refills: 0 | Status: DISCONTINUED | OUTPATIENT
Start: 2019-02-18 | End: 2019-02-19

## 2019-02-18 RX ORDER — SENNA PLUS 8.6 MG/1
2 TABLET ORAL
Qty: 0 | Refills: 0 | COMMUNITY
Start: 2019-02-18

## 2019-02-18 RX ORDER — CEFAZOLIN SODIUM 1 G
3000 VIAL (EA) INJECTION EVERY 8 HOURS
Qty: 0 | Refills: 0 | Status: COMPLETED | OUTPATIENT
Start: 2019-02-18 | End: 2019-02-18

## 2019-02-18 RX ORDER — INSULIN LISPRO 100/ML
VIAL (ML) SUBCUTANEOUS AT BEDTIME
Qty: 0 | Refills: 0 | Status: DISCONTINUED | OUTPATIENT
Start: 2019-02-18 | End: 2019-02-19

## 2019-02-18 RX ORDER — BENZOCAINE AND MENTHOL 5; 1 G/100ML; G/100ML
1 LIQUID ORAL
Qty: 0 | Refills: 0 | Status: DISCONTINUED | OUTPATIENT
Start: 2019-02-18 | End: 2019-02-19

## 2019-02-18 RX ORDER — SENNA PLUS 8.6 MG/1
2 TABLET ORAL AT BEDTIME
Qty: 0 | Refills: 0 | Status: DISCONTINUED | OUTPATIENT
Start: 2019-02-18 | End: 2019-02-19

## 2019-02-18 RX ORDER — INSULIN LISPRO 100/ML
VIAL (ML) SUBCUTANEOUS
Qty: 0 | Refills: 0 | Status: DISCONTINUED | OUTPATIENT
Start: 2019-02-18 | End: 2019-02-19

## 2019-02-18 RX ORDER — SODIUM CHLORIDE 9 MG/ML
1000 INJECTION, SOLUTION INTRAVENOUS
Qty: 0 | Refills: 0 | Status: DISCONTINUED | OUTPATIENT
Start: 2019-02-18 | End: 2019-02-19

## 2019-02-18 RX ORDER — KETOROLAC TROMETHAMINE 30 MG/ML
30 SYRINGE (ML) INJECTION ONCE
Qty: 0 | Refills: 0 | Status: DISCONTINUED | OUTPATIENT
Start: 2019-02-18 | End: 2019-02-18

## 2019-02-18 RX ORDER — ASCORBIC ACID 60 MG
500 TABLET,CHEWABLE ORAL
Qty: 0 | Refills: 0 | Status: DISCONTINUED | OUTPATIENT
Start: 2019-02-18 | End: 2019-02-19

## 2019-02-18 RX ADMIN — Medication 975 MILLIGRAM(S): at 06:26

## 2019-02-18 RX ADMIN — Medication 200 MILLIGRAM(S): at 23:25

## 2019-02-18 RX ADMIN — Medication 325 MILLIGRAM(S): at 17:05

## 2019-02-18 RX ADMIN — OXYCODONE HYDROCHLORIDE 5 MILLIGRAM(S): 5 TABLET ORAL at 14:10

## 2019-02-18 RX ADMIN — Medication 100 MILLIGRAM(S): at 23:25

## 2019-02-18 RX ADMIN — Medication 100 MILLIGRAM(S): at 13:00

## 2019-02-18 RX ADMIN — Medication 1 TABLET(S): at 17:04

## 2019-02-18 RX ADMIN — Medication 1000 MILLIGRAM(S): at 12:15

## 2019-02-18 RX ADMIN — Medication 500 MILLIGRAM(S): at 17:04

## 2019-02-18 RX ADMIN — Medication 1000 MILLIGRAM(S): at 17:20

## 2019-02-18 RX ADMIN — TRAMADOL HYDROCHLORIDE 50 MILLIGRAM(S): 50 TABLET ORAL at 07:14

## 2019-02-18 RX ADMIN — POLYETHYLENE GLYCOL 3350 17 GRAM(S): 17 POWDER, FOR SOLUTION ORAL at 17:04

## 2019-02-18 RX ADMIN — Medication 30 MILLIGRAM(S): at 12:01

## 2019-02-18 RX ADMIN — Medication 1 MILLIGRAM(S): at 17:04

## 2019-02-18 RX ADMIN — Medication 200 MILLIGRAM(S): at 15:08

## 2019-02-18 RX ADMIN — LISINOPRIL 2.5 MILLIGRAM(S): 2.5 TABLET ORAL at 12:58

## 2019-02-18 RX ADMIN — SODIUM CHLORIDE 75 MILLILITER(S): 9 INJECTION, SOLUTION INTRAVENOUS at 11:09

## 2019-02-18 RX ADMIN — SODIUM CHLORIDE 500 MILLILITER(S): 9 INJECTION INTRAMUSCULAR; INTRAVENOUS; SUBCUTANEOUS at 10:45

## 2019-02-18 RX ADMIN — Medication 30 MILLIGRAM(S): at 12:15

## 2019-02-18 RX ADMIN — Medication 400 MILLIGRAM(S): at 17:04

## 2019-02-18 RX ADMIN — GABAPENTIN 300 MILLIGRAM(S): 400 CAPSULE ORAL at 06:27

## 2019-02-18 RX ADMIN — Medication 1: at 17:04

## 2019-02-18 RX ADMIN — SODIUM CHLORIDE 3 MILLILITER(S): 9 INJECTION INTRAMUSCULAR; INTRAVENOUS; SUBCUTANEOUS at 06:29

## 2019-02-18 RX ADMIN — PANTOPRAZOLE SODIUM 40 MILLIGRAM(S): 20 TABLET, DELAYED RELEASE ORAL at 06:26

## 2019-02-18 RX ADMIN — Medication 400 MILLIGRAM(S): at 12:15

## 2019-02-18 RX ADMIN — OXYCODONE HYDROCHLORIDE 5 MILLIGRAM(S): 5 TABLET ORAL at 13:42

## 2019-02-18 RX ADMIN — SIMVASTATIN 10 MILLIGRAM(S): 20 TABLET, FILM COATED ORAL at 23:25

## 2019-02-18 NOTE — PRE-ANESTHESIA EVALUATION ADULT - NSANTHADDINFOFT_GEN_ALL_CORE
Given neuropathy in left lower extremity will choose GA over neuraxial.  Discussed with patient and surgeon and all are in agreement.  Will proceed with saphenous nerve block given its purely sensory properties, and this was also discussed with surgeon and patient as well.  Patient aware that baseline numbness and weakness will not improve after the nerve block wears off and could possibly worsen, even though it is unlikely.

## 2019-02-18 NOTE — DISCHARGE NOTE ADULT - ADDITIONAL INSTRUCTIONS
Continue ECOTRIN 325 mg by mouth 2x / day (at breakfast and at dinner) for a total of 6WEEKS   Follow up with Dr Hernandez in 2 WEEKS for dressing REMOVAL, wound check, and staple/suture removal (if applicable)   Follow up with your private internist / PMD in 42-3 weeks re: general checkup and BLOOD SUGAR check (and possible medication adjustment)   Please call for an appointment Recommend follow up with medical MD, within next 4 weeks.

## 2019-02-18 NOTE — DISCHARGE NOTE ADULT - CARE PROVIDER_API CALL
Emmanuel Hernandez)  Orthopaedic Surgery  [address], [facility] [address]  [place] [address]  Phone: [phone]  Fax: [phone]  Follow Up Time:

## 2019-02-18 NOTE — DISCHARGE NOTE ADULT - MEDICATION SUMMARY - MEDICATIONS TO TAKE
I will START or STAY ON the medications listed below when I get home from the hospital:    acetaminophen 325 mg oral tablet  -- 3 tab(s) by mouth every 8 hours  -- Indication: For Pain medication x 7 days    Naprosyn 500 mg oral tablet  -- 1 tab(s) by mouth 2 times a day x 14 days  -- Indication: For Antiinflammatory agent    oxyCODONE 5 mg oral tablet  -- 1 or 2 tab(s) by mouth every 4 hours, As needed, for Pain MDD:8  -- Indication: For Pain medication    traMADol 50 mg oral tablet  -- 1 tab(s) by mouth every 6 hours, As needed, Mild Pain (1 - 3) MDD:4  -- Indication: For Pain medication    aspirin 325 mg oral delayed release tablet  -- 1 tab(s) by mouth 2 times a day x 6 weeks  -- Indication: For Antiplatelet therapy    lisinopril 2.5 mg oral tablet  -- 1 tab(s) by mouth once a day  -- Indication: For Antihypertensive agent    metFORMIN 500 mg oral tablet  -- 1 tab(s) by mouth once a day  -- Indication: For Antidiabetic agent    simvastatin 10 mg oral tablet  -- 1 tab(s) by mouth once a day (at bedtime)  -- Indication: For Antihyperlipidemia agent    docusate sodium 100 mg oral capsule  -- 1 cap(s) by mouth 3 times a day  Purchase over-the-counter Colace 100mg and continue to take three times-a-day to prevent constipation while on pain meds.    -- Indication: For Stool softener    polyethylene glycol 3350 oral powder for reconstitution  -- 17 gram(s) by mouth once a day  while on pain medications   -- Indication: For Laxative agent    pantoprazole 40 mg oral delayed release tablet  -- 1 tab(s) by mouth once a day (before a meal)  -- Indication: For Antidyspepsia agent    Multiple Vitamins oral tablet  -- 1 tab(s) by mouth once a day  -- Indication: For Supplement

## 2019-02-18 NOTE — DISCHARGE NOTE ADULT - CARE PLAN
Principal Discharge DX:	Primary osteoarthritis of left knee  Goal:	improved ambulation and pain control  Assessment and plan of treatment:	Keep Aquacel dressing clean and dry   ice to affected incision every 4-6 hours x 72 hours   elevate affected extremity when @ rest   Weight bearing as tolerated Principal Discharge DX:	Primary osteoarthritis of left knee  Goal:	Pain relief, improvement with activities of daily living  Assessment and plan of treatment:	Please call Dr. Hernandez's office within next few days to schedule a follow up appointment about 14 days after surgery. Dressing will be changed during office visit. Out of bed, ambulate, weight bearing as tolerated- Physical therapy to assist with exercise and help increase endurance

## 2019-02-18 NOTE — DISCHARGE NOTE ADULT - NS AS ACTIVITY OBS
Walking-Outdoors allowed/Stairs allowed/Showering allowed/Do not drive or operate machinery/Bathing allowed/No Heavy lifting/straining/May shower; protect Aquacel dressing with water-tight seal  i.e. saran wrap; pat dry Do not drive or operate machinery/Patient may shower, limit direct water to dressing, if wet pat dry/No Heavy lifting/straining/Walking-Outdoors allowed/Stairs allowed/Showering allowed/Bathing allowed

## 2019-02-18 NOTE — PRE-ANESTHESIA EVALUATION ADULT - NSANTHOSAYNRD_GEN_A_CORE
No. HAMLET screening performed.  STOP BANG Legend: 0-2 = LOW Risk; 3-4 = INTERMEDIATE Risk; 5-8 = HIGH Risk
No. HAMLET screening performed.  STOP BANG Legend: 0-2 = LOW Risk; 3-4 = INTERMEDIATE Risk; 5-8 = HIGH Risk

## 2019-02-18 NOTE — DISCHARGE NOTE ADULT - PATIENT PORTAL LINK FT
You can access the [facility] Portal, offered by [facility], by registering with the following website: [email or web address]

## 2019-02-18 NOTE — PRE-ANESTHESIA EVALUATION ADULT - NSANTHPMHFT_GEN_ALL_CORE
RTKR on 11/17/17 at [facility].  After discharge on 11/22/17 he went to [facility] for GORDON with chest pressure.  PE workup negative and he was discharged home same day. Symptoms did not improve and on 11/23/17 he was admitted to [facility] ICU.  As per pt he stayed in the hospital for a few days and was discharged  home in good health without a clear diagnosis. Currently denies chest pain, palpitations, SOB, N/V, fever or chills.    2/18/2019 medical clearance in chart RTKR on 11/17/17 at [facility].  After discharge on 11/22/17 he went to [place] ER for GORDON with chest pressure.  PE workup negative and he was discharged home same day. Symptoms did not improve and on 11/23/17 he was admitted to [facility] ICU.  As per pt he stayed in the hospital for a few days and was discharged  home in good health without a clear diagnosis. Currently denies chest pain, palpitations, SOB, N/V, fever or chills.    Left leg numbness and weakness chronically.  Numbness predominantly lower leg and toes, weakness in foot.    2/18/2019 medical clearance in chart

## 2019-02-18 NOTE — PHYSICAL THERAPY INITIAL EVALUATION ADULT - PERTINENT HX OF CURRENT PROBLEM, REHAB EVAL
38 years old male H/O Morbid obesity (BMI 40), Prediabetes,  Hyperlipemia, Osteochondritis s/p RTKR on 11/17/17 at [facility]. Pt presents to s/p Left Total Knee Replacement on 2/18/19

## 2019-02-18 NOTE — PHYSICAL THERAPY INITIAL EVALUATION ADULT - LEVEL OF CONSCIOUSNESS, REHAB EVAL
Dr. Calderón (Attending Physician)  Pain well controlled pulling 500 on IS. Will send urine cx today. CHF restated home meds.  JOE will check urine electrolytes today but volume status appears euvolemic. alert

## 2019-02-18 NOTE — CHART NOTE - NSCHARTNOTEFT_GEN_A_CORE
Resting without complaints.  in NAD    No Chest Pain, SOB, N/V.    T(C): 36 (02-18-19 @ 10:30), Max: 36.7 (02-18-19 @ 06:33)  HR: 71 (02-18-19 @ 12:30) (68 - 83)  BP: 162/78 (02-18-19 @ 12:30) (120/78 - 173/100)  RR: 18 (02-18-19 @ 12:30) (16 - 23)  SpO2: 95% (02-18-19 @ 12:30) (94% - 100%)      Exam:  Alert and [place], No Acute Distress  Card: +S1/S2, RRR  Pulm: CTAB  Abdomen soft / benign  Deng  [ n]   EXT   LLE        ACE Dressing (s) C/D  [x ]            Calves soft       (+) DF  PT;  EHL 5/5        No Sensory Deficits noted        2+ pulses    Xray:---- prosthesis in good alignment                          15.9   7.6   )-----------( 254      ( 18 Feb 2019 11:06 )             46.6      02-18    138  |  102  |  11  ----------------------------<  164<H>  4.5   |  21<L>  |  0.98      A/P: S/p Total L knee arthroplasty      -PT/OT-WBAT-  -Chk AM Labs  -DVT PPx: Ecotrin BID  -Pain Control PO/IV Pain Rx  -Continue Current Tx  -Dispo planning: anticipate home      ***See Above  Jamal MITTAL  Orthopedics  B: [phone]  S: [phone]

## 2019-02-18 NOTE — DISCHARGE NOTE ADULT - PLAN OF CARE
improved ambulation and pain control Keep Aquacel dressing clean and dry   ice to affected incision every 4-6 hours x 72 hours   elevate affected extremity when @ rest   Weight bearing as tolerated Pain relief, improvement with activities of daily living Please call Dr. Hernandez's office within next few days to schedule a follow up appointment about 14 days after surgery. Dressing will be changed during office visit. Out of bed, ambulate, weight bearing as tolerated- Physical therapy to assist with exercise and help increase endurance

## 2019-02-18 NOTE — DISCHARGE NOTE ADULT - HOSPITAL COURSE
Other Care Providers:  · Primary Care Provider	Dr. Matias  next visit 2/11/19	  · Care Providers for Follow up (PCP/Outpatient Provider)	Dr. Quiñones cardiologist [phone] next visit 2/11/19	    Chief Complaint/Reason for Visit/HPI:    Reason for Admission:  Reason for Admission	Left knee pain	     History of Present Illness:  History of Present Illness		  38 years old male H/O Morbid obesity (BMI 40), Prediabetes,  Hyperlipemia, Osteochondritis s/p RTKR on 11/17/17 at [facility]. After discharge 11/22/17 he went ot [facility] ER for c/o hiccups, dyspnia worsening on exertion associated with chest pressure. PE workup  done and was negative, he was discharged home same day. His symptoms did not improved and on 11/23/17 he was admitted to  [facility] ICU care as per pt he staid in the hospital for a few days and was discharged  home in good health without clear diagnosis. Pt presents to PST for scheduled Left Total Knee Replacement on 2/18/19 for c/o Left knee pain due to  known osteoarthritis.  Denies any chest pain, palpitation, SOB, N/V, fever or chills.     Allergies/Medications:   Allergies:        Allergies:  	No Known Allergies:     Home Medications:   * Patient Currently Takes Medications as of 22-Nov-2017 21:18 documented in Structured Notes  · 	chlorproMAZINE 50 mg oral tablet: 1 tab(s) orally 3 times a day     .    PMH/PSH/FH/SH:    Past Medical History:  Hyperlipemia    Morbid obesity with BMI of 40.0-44.9, adult    Osteochondritis    Prediabetes    Sciatica of left side.     Past Surgical History:  H/O total knee replacement, right  11/17/17.    Hospital Course:   2/18:  Pt underwent L TKA w/ dr Hernandez. No Complications           Eval by PT:  WBAT LLE  who recommends ____________________ Other Care Providers:  · Primary Care Provider	Dr. Matias  next visit 2/11/19	  · Care Providers for Follow up (PCP/Outpatient Provider)	Dr. Quiñones cardiologist [phone] next visit 2/11/19	    Chief Complaint/Reason for Visit/HPI:    Reason for Admission:  Reason for Admission	Left knee pain	     History of Present Illness:  History of Present Illness		  38 years old male H/O Morbid obesity (BMI 40), Prediabetes,  Hyperlipemia, Osteochondritis s/p RTKR on 11/17/17 at [facility]. After discharge 11/22/17 he went ot [facility] ER for c/o hiccups, dyspnia worsening on exertion associated with chest pressure. PE workup  done and was negative, he was discharged home same day. His symptoms did not improved and on 11/23/17 he was admitted to  [facility] ICU care as per pt he staid in the hospital for a few days and was discharged  home in good health without clear diagnosis. Pt presents to PST for scheduled Left Total Knee Replacement on 2/18/19 for c/o Left knee pain due to  known osteoarthritis.  Denies any chest pain, palpitation, SOB, N/V, fever or chills.     Allergies/Medications:   Allergies:        Allergies:  	No Known Allergies:     Home Medications:   * Patient Currently Takes Medications as of 22-Nov-2017 21:18 documented in Structured Notes  · 	chlorproMAZINE 50 mg oral tablet: 1 tab(s) orally 3 times a day     .    PMH/PSH/FH/SH:    Past Medical History:  Hyperlipemia    Morbid obesity with BMI of 40.0-44.9, adult    Osteochondritis    Prediabetes    Sciatica of left side.     Past Surgical History:  H/O total knee replacement, right  11/17/17.    Hospital Course:   2/18:  Patient presents to the hospital for elective surgery underwent a left total knee replacement- tolerated procedure without complications.  Patient was evaluated by Physical therapy whom recommended home for discharge plan.  Patient is stable for discharge when cleared by PT.

## 2019-02-18 NOTE — PRE-ANESTHESIA EVALUATION ADULT - NSPROPOSEDPROCEDFT_GEN_ALL_CORE
Referral received from Cheryl Miranda NP (Rashad gale) to schedule patient for Colonoscopy dx Colon cancer screening [Z12.11].  Referral to unspecified provider.    Payor: [facility]   Left Total Knee Replacement

## 2019-02-18 NOTE — PHYSICAL THERAPY INITIAL EVALUATION ADULT - GAIT DEVIATIONS NOTED, PT EVAL
decreased step length/antalgic/decreased gayla/increased time in double stance/decreased weight-shifting ability/decreased stride length

## 2019-02-18 NOTE — PHYSICAL THERAPY INITIAL EVALUATION ADULT - ADDITIONAL COMMENTS
Pt reports he lives with his young children in a house in [place], Pt has 10 steps to enter +rail and an additional 10 stairs to the 2nd floor +rail. Pt was independent with all mobility prior to admit.

## 2019-02-19 ENCOUNTER — INBOUND DOCUMENT (OUTPATIENT)
Age: 39
End: 2019-02-19

## 2019-02-19 VITALS — HEART RATE: 84 BPM | SYSTOLIC BLOOD PRESSURE: 136 MMHG | DIASTOLIC BLOOD PRESSURE: 74 MMHG

## 2019-02-19 PROBLEM — E66.01 MORBID (SEVERE) OBESITY DUE TO EXCESS CALORIES: Chronic | Status: ACTIVE | Noted: 2019-02-04

## 2019-02-19 PROBLEM — M93.90 OSTEOCHONDROPATHY, UNSPECIFIED OF UNSPECIFIED SITE: Chronic | Status: ACTIVE | Noted: 2019-02-04

## 2019-02-19 PROBLEM — M54.32 SCIATICA, LEFT SIDE: Chronic | Status: ACTIVE | Noted: 2019-02-04

## 2019-02-19 PROBLEM — E78.5 HYPERLIPIDEMIA, UNSPECIFIED: Chronic | Status: ACTIVE | Noted: 2019-02-04

## 2019-02-19 PROBLEM — R73.03 PREDIABETES: Chronic | Status: ACTIVE | Noted: 2019-02-04

## 2019-02-19 LAB
ANION GAP SERPL CALC-SCNC: 13 MMOL/L — SIGNIFICANT CHANGE UP (ref 5–17)
BUN SERPL-MCNC: 14 MG/DL — SIGNIFICANT CHANGE UP (ref 7–23)
CALCIUM SERPL-MCNC: 9.3 MG/DL — SIGNIFICANT CHANGE UP (ref 8.4–10.5)
CHLORIDE SERPL-SCNC: 100 MMOL/L — SIGNIFICANT CHANGE UP (ref 96–108)
CO2 SERPL-SCNC: 26 MMOL/L — SIGNIFICANT CHANGE UP (ref 22–31)
CREAT SERPL-MCNC: 1.15 MG/DL — SIGNIFICANT CHANGE UP (ref 0.5–1.3)
GLUCOSE BLDC GLUCOMTR-MCNC: 141 MG/DL — HIGH (ref 70–99)
GLUCOSE BLDC GLUCOMTR-MCNC: 147 MG/DL — HIGH (ref 70–99)
GLUCOSE SERPL-MCNC: 152 MG/DL — HIGH (ref 70–99)
HCT VFR BLD CALC: 44.3 % — SIGNIFICANT CHANGE UP (ref 39–50)
HGB BLD-MCNC: 14.5 G/DL — SIGNIFICANT CHANGE UP (ref 13–17)
MCHC RBC-ENTMCNC: 28.4 PG — SIGNIFICANT CHANGE UP (ref 27–34)
MCHC RBC-ENTMCNC: 32.7 GM/DL — SIGNIFICANT CHANGE UP (ref 32–36)
MCV RBC AUTO: 86.7 FL — SIGNIFICANT CHANGE UP (ref 80–100)
PLATELET # BLD AUTO: 308 K/UL — SIGNIFICANT CHANGE UP (ref 150–400)
POTASSIUM SERPL-MCNC: 4.2 MMOL/L — SIGNIFICANT CHANGE UP (ref 3.5–5.3)
POTASSIUM SERPL-SCNC: 4.2 MMOL/L — SIGNIFICANT CHANGE UP (ref 3.5–5.3)
RBC # BLD: 5.11 M/UL — SIGNIFICANT CHANGE UP (ref 4.2–5.8)
RBC # FLD: 13.5 % — SIGNIFICANT CHANGE UP (ref 10.3–14.5)
SODIUM SERPL-SCNC: 139 MMOL/L — SIGNIFICANT CHANGE UP (ref 135–145)
WBC # BLD: 10.91 K/UL — HIGH (ref 3.8–10.5)
WBC # FLD AUTO: 10.91 K/UL — HIGH (ref 3.8–10.5)

## 2019-02-19 RX ORDER — PANTOPRAZOLE SODIUM 20 MG/1
1 TABLET, DELAYED RELEASE ORAL
Qty: 30 | Refills: 0 | OUTPATIENT
Start: 2019-02-19 | End: 2019-03-20

## 2019-02-19 RX ORDER — PANTOPRAZOLE SODIUM 20 MG/1
1 TABLET, DELAYED RELEASE ORAL
Qty: 0 | Refills: 0 | COMMUNITY
Start: 2019-02-19

## 2019-02-19 RX ORDER — TRAMADOL HYDROCHLORIDE 50 MG/1
1 TABLET ORAL
Qty: 0 | Refills: 0 | COMMUNITY
Start: 2019-02-19

## 2019-02-19 RX ORDER — ASPIRIN/CALCIUM CARB/MAGNESIUM 324 MG
1 TABLET ORAL
Qty: 84 | Refills: 0 | OUTPATIENT
Start: 2019-02-19 | End: 2019-04-01

## 2019-02-19 RX ORDER — OXYCODONE HYDROCHLORIDE 5 MG/1
1 TABLET ORAL
Qty: 50 | Refills: 0 | OUTPATIENT
Start: 2019-02-19

## 2019-02-19 RX ORDER — OXYCODONE HYDROCHLORIDE 5 MG/1
1 TABLET ORAL
Qty: 0 | Refills: 0 | COMMUNITY
Start: 2019-02-19

## 2019-02-19 RX ORDER — TRAMADOL HYDROCHLORIDE 50 MG/1
1 TABLET ORAL
Qty: 28 | Refills: 0 | OUTPATIENT
Start: 2019-02-19 | End: 2019-02-25

## 2019-02-19 RX ADMIN — POLYETHYLENE GLYCOL 3350 17 GRAM(S): 17 POWDER, FOR SOLUTION ORAL at 11:49

## 2019-02-19 RX ADMIN — Medication 100 MILLIGRAM(S): at 05:49

## 2019-02-19 RX ADMIN — OXYCODONE HYDROCHLORIDE 10 MILLIGRAM(S): 5 TABLET ORAL at 10:00

## 2019-02-19 RX ADMIN — OXYCODONE HYDROCHLORIDE 10 MILLIGRAM(S): 5 TABLET ORAL at 10:30

## 2019-02-19 RX ADMIN — Medication 1 MILLIGRAM(S): at 11:49

## 2019-02-19 RX ADMIN — Medication 325 MILLIGRAM(S): at 08:23

## 2019-02-19 RX ADMIN — Medication 325 MILLIGRAM(S): at 05:49

## 2019-02-19 RX ADMIN — Medication 500 MILLIGRAM(S): at 05:48

## 2019-02-19 RX ADMIN — OXYCODONE HYDROCHLORIDE 10 MILLIGRAM(S): 5 TABLET ORAL at 06:31

## 2019-02-19 RX ADMIN — Medication 400 MILLIGRAM(S): at 02:49

## 2019-02-19 RX ADMIN — LISINOPRIL 2.5 MILLIGRAM(S): 2.5 TABLET ORAL at 05:48

## 2019-02-19 RX ADMIN — PANTOPRAZOLE SODIUM 40 MILLIGRAM(S): 20 TABLET, DELAYED RELEASE ORAL at 05:48

## 2019-02-19 RX ADMIN — Medication 325 MILLIGRAM(S): at 11:49

## 2019-02-19 RX ADMIN — SODIUM CHLORIDE 500 MILLILITER(S): 9 INJECTION INTRAMUSCULAR; INTRAVENOUS; SUBCUTANEOUS at 05:52

## 2019-02-19 RX ADMIN — Medication 1 TABLET(S): at 11:49

## 2019-02-19 RX ADMIN — OXYCODONE HYDROCHLORIDE 10 MILLIGRAM(S): 5 TABLET ORAL at 05:49

## 2019-02-19 NOTE — PROGRESS NOTE ADULT - SUBJECTIVE AND OBJECTIVE BOX
Orthopaedic Surgery Progress Note    Subjective:   Patient seen and examined  No acute events overnight  Ambulated 300ft yest and walked stairs  Denies cp/sob/f/c    Objective:  T(C): 37.1 (02-19-19 @ 04:25), Max: 37.2 (02-18-19 @ 20:49)  HR: 80 (02-19-19 @ 04:25) (68 - 111)  BP: 133/76 (02-19-19 @ 04:25) (110/62 - 173/100)  RR: 18 (02-19-19 @ 04:25) (16 - 23)  SpO2: 97% (02-19-19 @ 04:25) (94% - 100%)  Wt(kg): --    02-18 @ 07:01  -  02-19 @ 06:43  --------------------------------------------------------  IN: 1630 mL / OUT: 3225 mL / NET: -1595 mL        PE    NAD  LLE:   dressing C/D/I  motor intact GS/TA/EHL  SILT S/S/SP/DP  WWP, BCR                          15.9   7.6   )-----------( 254      ( 18 Feb 2019 11:06 )             46.6     02-18    138  |  102  |  11  ----------------------------<  164<H>  4.5   |  21<L>  |  0.98    Ca    8.8      18 Feb 2019 11:06      38y Male s/p L TKA  - Pain control  - WBAT  - PT/OT/OOB  - DVT ppx  - Dispo planning

## 2019-02-19 NOTE — OCCUPATIONAL THERAPY INITIAL EVALUATION ADULT - ANTICIPATED DISCHARGE DISPOSITION, OT EVAL
Home with no skilled OT needs, assist with ADLs as needed from family. Pt provided with raised toilet seat

## 2019-02-19 NOTE — PROGRESS NOTE ADULT - ATTENDING COMMENTS
Patient is a 38y old  Male   L Total Knee Replacement       Patient comfortable:    No complaints    I agree with the resident or Physician's Assistant current note.    PHYSICAL EXAM:    Alert, NAD    Knee: Dressing C/D/I; sensation grossly intact to touch; (+) DF/PF; (+) Distal Pulses; No Calf tenderness B/L         Physical therapy. Patient is ambulating, progressing with ROM, sitting comfortably.       Plan for Disposition:  home    Emmanuel Hernandez MD  [facility]  [phone]

## 2019-02-19 NOTE — OCCUPATIONAL THERAPY INITIAL EVALUATION ADULT - LIVES WITH, PROFILE
Pt lives with family in private home with 10 steps to enter, flight to bedroom, walk in shower in bathroom. Pt I in ADLs and ambulation prior to admission

## 2019-02-19 NOTE — OCCUPATIONAL THERAPY INITIAL EVALUATION ADULT - ADDITIONAL COMMENTS
Pt presents to PST for scheduled Left Total Knee Replacement on 2/18/19 for c/o Left knee pain due to  known osteoarthritis.  Denies any chest pain, palpitation, SOB, N/V, fever or chills.  Xray L Knee: Status-post left total knee arthroplasty.

## 2019-02-21 LAB — SURGICAL PATHOLOGY STUDY: SIGNIFICANT CHANGE UP

## 2019-02-26 ENCOUNTER — APPOINTMENT (OUTPATIENT)
Dept: ORTHOPEDIC SURGERY | Facility: CLINIC | Age: 39
End: 2019-02-26
Payer: MEDICAID

## 2019-02-26 DIAGNOSIS — Z96.652 PRESENCE OF LEFT ARTIFICIAL KNEE JOINT: ICD-10-CM

## 2019-02-26 PROCEDURE — 99024 POSTOP FOLLOW-UP VISIT: CPT

## 2019-02-26 NOTE — HISTORY OF PRESENT ILLNESS
[Clean/Dry/Intact] : clean, dry and intact [Doing Well] : is doing well [Excellent Pain Control] : has excellent pain control [No Sign of Infection] : is showing no signs of infection [None] : None [Chills] : no chills [Fever] : no fever [Nausea] : no nausea [Vomiting] : no vomiting [Erythema] : not erythematous [de-identified] : s/p L TKR 2/18/19 [de-identified] : Pt is a 39 y/o male s/p L TKR 2/18/19.  He takes Tramadol and Oxycodone for pain.  He takes Aspirin for DVT prophylaxis.  He has not had any home care or PT. [de-identified] : His wound is healing very well.  There is no swelling.  He has not yet started physical therapy.  His motion goes from full extension to 85 degrees of flexion.  He will start now working on range of motion.  He is full weightbearing. [de-identified] : Return visit in 1 week

## 2019-03-01 ENCOUNTER — APPOINTMENT (OUTPATIENT)
Dept: ORTHOPEDIC SURGERY | Facility: CLINIC | Age: 39
End: 2019-03-01

## 2019-03-05 NOTE — BRIEF OPERATIVE NOTE - PROCEDURE
<<-----Click on this checkbox to enter Procedure no Total knee arthroplasty  02/18/2019  Left  Active  JANINE

## 2019-03-06 ENCOUNTER — APPOINTMENT (OUTPATIENT)
Dept: ORTHOPEDIC SURGERY | Facility: CLINIC | Age: 39
End: 2019-03-06
Payer: MEDICAID

## 2019-03-06 PROCEDURE — 73562 X-RAY EXAM OF KNEE 3: CPT | Mod: LT

## 2019-03-06 PROCEDURE — 99024 POSTOP FOLLOW-UP VISIT: CPT

## 2019-03-06 RX ORDER — TRAMADOL HYDROCHLORIDE 50 MG/1
50 TABLET, COATED ORAL
Qty: 90 | Refills: 0 | Status: ACTIVE | COMMUNITY
Start: 2019-03-06 | End: 1900-01-01

## 2019-03-12 ENCOUNTER — APPOINTMENT (OUTPATIENT)
Dept: ORTHOPEDIC SURGERY | Facility: CLINIC | Age: 39
End: 2019-03-12

## 2019-04-17 ENCOUNTER — APPOINTMENT (OUTPATIENT)
Dept: ORTHOPEDIC SURGERY | Facility: CLINIC | Age: 39
End: 2019-04-17

## 2019-04-19 ENCOUNTER — TRANSCRIPTION ENCOUNTER (OUTPATIENT)
Age: 39
End: 2019-04-19

## 2019-05-19 ENCOUNTER — FORM ENCOUNTER (OUTPATIENT)
Age: 39
End: 2019-05-19

## 2019-09-27 ENCOUNTER — APPOINTMENT (OUTPATIENT)
Dept: ORTHOPEDIC SURGERY | Facility: CLINIC | Age: 39
End: 2019-09-27
Payer: MEDICAID

## 2019-09-27 VITALS
SYSTOLIC BLOOD PRESSURE: 142 MMHG | HEIGHT: 72 IN | WEIGHT: 284 LBS | BODY MASS INDEX: 38.47 KG/M2 | DIASTOLIC BLOOD PRESSURE: 84 MMHG | HEART RATE: 86 BPM

## 2019-09-27 DIAGNOSIS — M25.562 PAIN IN LEFT KNEE: ICD-10-CM

## 2019-09-27 PROCEDURE — 20611 DRAIN/INJ JOINT/BURSA W/US: CPT | Mod: LT

## 2019-09-27 PROCEDURE — 99213 OFFICE O/P EST LOW 20 MIN: CPT | Mod: 25

## 2019-09-27 NOTE — PHYSICAL EXAM
[de-identified] : this patient is doing everted well after his left knee replacement he has full extension and flexes to 120° he has some discomfort directly over the attachment of the medial collateral ligament on the femur and is for a well localized. His mediolateral and posterior stability.

## 2019-09-27 NOTE — HISTORY OF PRESENT ILLNESS
[10] : a maximum pain level of 10/10 [Pain Location] : pain [Intermit.] : ~He/She~ states the symptoms seem to be intermittent [Standing] : standing [Walking] : worsened by walking [de-identified] : Patient is  a 39yo who is s/p L TKR 2/18/19. He takes Tramadol as needed for pain. He started to have worsening pain for the past 3 months.  He has medial knee pain.  Pain can be severe at times.

## 2019-09-27 NOTE — PROCEDURE
[de-identified] : Procedure Note:\par \par Anatomic Location: medial collateral ligament inflammation  Left Knee\par \par Diagnosis:  tendinitis after total knee replacement\par \par Procedure:  Injection of 2cc of Marcaine 0.25% plain and Celestone 1cc (6 MG)\par \par Local Spray: Ethyl Chloride.\par \par Preparation of the skin with Alcohol.\par \par Skin Preparation with Alcohol\par \par Patient has consented for the procedure.\par \par Injection  through a lateral parapatella approach.\par \par Patient tolerated the procedure well.\par \par Patient instructed to call the office if any reaction, fever, chills, increased erythema or swelling.   [phone].\par

## 2019-09-27 NOTE — DISCUSSION/SUMMARY
[de-identified] : following local injection transferred well localized spot he felt much better we'll follow him conservatively return visit in 3-6 months.

## 2019-10-31 PROCEDURE — 80048 BASIC METABOLIC PNL TOTAL CA: CPT

## 2019-10-31 PROCEDURE — 97161 PT EVAL LOW COMPLEX 20 MIN: CPT

## 2019-10-31 PROCEDURE — 85027 COMPLETE CBC AUTOMATED: CPT

## 2019-10-31 PROCEDURE — 88305 TISSUE EXAM BY PATHOLOGIST: CPT

## 2019-10-31 PROCEDURE — 86900 BLOOD TYPING SEROLOGIC ABO: CPT

## 2019-10-31 PROCEDURE — C1776: CPT

## 2019-10-31 PROCEDURE — 82962 GLUCOSE BLOOD TEST: CPT

## 2019-10-31 PROCEDURE — C1713: CPT

## 2019-10-31 PROCEDURE — 73560 X-RAY EXAM OF KNEE 1 OR 2: CPT

## 2019-10-31 PROCEDURE — 88311 DECALCIFY TISSUE: CPT

## 2019-10-31 PROCEDURE — 97165 OT EVAL LOW COMPLEX 30 MIN: CPT

## 2019-10-31 PROCEDURE — 86901 BLOOD TYPING SEROLOGIC RH(D): CPT

## 2019-11-26 NOTE — HISTORY OF PRESENT ILLNESS
[Clean/Dry/Intact] : clean, dry and intact [Doing Well] : is doing well [Excellent Pain Control] : has excellent pain control [No Sign of Infection] : is showing no signs of infection [None] : None [Chills] : no chills [Fever] : no fever [Nausea] : no nausea [Vomiting] : no vomiting [Erythema] : not erythematous [de-identified] : s/p L TKR 2/18/19 [de-identified] : Pt is a 39 y/o male s/p L TKR 2/18/19.  He takes Tramadol and Oxycodone for pain.  He takes Aspirin for DVT prophylaxis.  He has not had any home care or PT. [de-identified] : His wound is healing very well.  Staples are removed his motion goes now from full extension to past 110 degrees of flexion.  He has good medial lateral and posterior stability is walking well. [de-identified] : 3 views of the patient's left knee with an AP of the right shows a left total knee replacement is a DJ O cemented knee in good position and well fixed the patella tracks well. [de-identified] : Return visit in 6 weeks 60

## 2020-01-28 NOTE — OCCUPATIONAL THERAPY INITIAL EVALUATION ADULT - PREDICTED DURATION OF THERAPY (DAYS/WKS), OT EVAL
PRIMARY CARE VISIT        Patient name : Kiersten Metz   MRN number: [id number]   YOB: 1956       Reason for visit:   Chief Complaint   Patient presents with   • Annual Exam   • Medicare Wellness Visit          Quality    Quality reviewed     History of Present Illness  Ms. Kiersten Metz, 64 years old -American woman came in for the first time at [facility] since she changed her insurance she is to follow-up with nurse practitioners in the past for her diabetes and hypertension evaluations.  Patient complaint stiffness in the fingers numbness in the feet, some medication refills last blood test few months back none of these available at this time, patient also had a colonoscopy sometime last year and had a mammogram at the end of the year and also seen the ophthalmology at end of the year, patient states she works at [employer], sometimes work involves lifting pushing pulling and hands get numb to hold any weights, pain in fingers stiffness in fingers.  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  Denies any GI or  concerns at this time.   HPI  Review of Systems       Review of Systems   Constitutional: Positive for fatigue. Negative for activity change, appetite change, chills, diaphoresis, fever and unexpected weight change.   HENT: Negative for congestion, dental problem, drooling, ear discharge, ear pain, facial swelling, hearing loss, mouth sores, nosebleeds, postnasal drip, rhinorrhea, sinus pressure, sinus pain, sneezing, sore throat, tinnitus, trouble swallowing and voice change.    Eyes: Negative for photophobia, pain, discharge, redness, itching and visual disturbance.   Respiratory: Negative for apnea, cough, choking, chest tightness, shortness of breath, wheezing and stridor.    Cardiovascular: Negative for chest pain, palpitations and leg swelling.   Gastrointestinal: Negative for  abdominal distention, abdominal pain, anal bleeding, blood in stool, constipation, diarrhea, nausea, rectal pain and vomiting.   Endocrine: Negative for cold intolerance, heat intolerance, polydipsia, polyphagia and polyuria.   Genitourinary: Negative for decreased urine volume, difficulty urinating, dyspareunia, dysuria, enuresis, flank pain, frequency, genital sores, hematuria, menstrual problem, pelvic pain, urgency, vaginal bleeding, vaginal discharge and vaginal pain.   Musculoskeletal: Positive for arthralgias. Negative for back pain, gait problem, joint swelling, myalgias, neck pain and neck stiffness.        Stiffness in hands and fingers, pain in the both feet   Skin: Negative for color change, pallor, rash and wound.   Allergic/Immunologic: Negative for environmental allergies, food allergies and immunocompromised state.   Neurological: Positive for numbness. Negative for dizziness, tremors, seizures, syncope, facial asymmetry, speech difficulty, weakness, light-headedness and headaches.   Hematological: Negative for adenopathy. Does not bruise/bleed easily.   Psychiatric/Behavioral: Negative for agitation, behavioral problems, confusion, decreased concentration, dysphoric mood, hallucinations, self-injury, sleep disturbance and suicidal ideas. The patient is not nervous/anxious and is not hyperactive.    All other systems reviewed and are negative.       Allergies  ALLERGIES:   Allergen Reactions   • Codeine PRURITUS   • Morphine PRURITUS   • Penicillins PRURITUS       Current Meds  Current Outpatient Medications   Medication Sig Dispense Refill   • Cholecalciferol ( ULTRA STRENGTH) 50 mcg (2,000 units) capsule 1 CAPSULE DAILY     • PRAVASTATIN SODIUM PO once daily     • metoPROLOL tartrate (LOPRESSOR) 50 MG tablet Take 50 mg by mouth.     • insulin aspart (NOVOLOG MIX 70/30) 70-30 (100 UNIT/ML) injectable suspension None Entered     • ONETOUCH DELICA LANCETS 33G Misc U QID     • latanoprost  (XALATAN) 0.005 % ophthalmic solution Apply 1 drop to eye.     • loratadine (CLARITIN) 10 MG tablet Take 10 mg by mouth.     • metFORMIN (GLUCOPHAGE-XR) 500 MG 24 hr tablet Take 2 tablets by mouth daily (with breakfast). 90 tablet 1   • meclizine (ANTIVERT) 25 MG tablet Take 1 tablet by mouth 3 times daily as needed for Dizziness. 90 tablet 0   • aspirin 81 MG tablet Take 1 tablet by mouth daily. 90 tablet 3   • HUMALOG KWIKPEN 100 UNIT/ML pen-injector INJ 44 UNI SC TID  0   • LANTUS SOLOSTAR 100 UNIT/ML pen-injector INJ 52 UNI SC BID FOR 90 DAYS  1   • gabapentin (NEURONTIN) 100 MG capsule Take 100 mg by mouth.     • DILT- MG 24 hr capsule TK 1 C PO QD  4   • montelukast (SINGULAIR) 10 MG tablet Take 10 mg by mouth.     • pantoprazole (PROTONIX) 40 MG tablet TK 1 T PO QD  3   • lansoprazole (PREVACID) 15 MG capsule Take 15 mg by mouth.       No current facility-administered medications for this visit.      .       Past Medical History  Past Medical History:   Diagnosis Date   • Allergy    • Diabetes mellitus (CMS/HCC)        Surgical History  Past Surgical History:   Procedure Laterality Date   • Hysterectomy  2004   • Tubal ligation  1990       Family History  History reviewed. No pertinent family history.    Social History  Social History     Tobacco Use   • Smoking status: Never Smoker   • Smokeless tobacco: Never Used   Substance Use Topics   • Alcohol use: Not Currently   • Drug use: Not Currently               Vitals  Visit Vitals  /80   Pulse 72   Temp 98 °F (36.7 °C)   Ht 5' (1.524 m)   Wt 81.1 kg (178 lb 12.7 oz)   SpO2 99%   BMI 34.92 kg/m²              Physical Exam   Constitutional: She is oriented to person, place, and time. She appears well-developed and well-nourished. No distress.   HENT:   Head: Normocephalic and atraumatic.   Right Ear: External ear normal.   Left Ear: External ear normal.   Nose: Nose normal.   Mouth/Throat: Oropharynx is clear and moist. No oropharyngeal exudate.    Normal HEENT examination   Eyes: Pupils are equal, round, and reactive to light. Conjunctivae and EOM are normal. Right eye exhibits no discharge. Left eye exhibits no discharge. No scleral icterus.   Neck: Normal range of motion. Neck supple. No JVD present. No tracheal deviation present. No thyromegaly present.   Cardiovascular: Normal rate, regular rhythm and intact distal pulses. Exam reveals no gallop and no friction rub.   No murmur heard.  Regular rate and rhythm, no S3 no S4, no JVD, no carotid bruit, pedal pulses okay   Pulmonary/Chest: Effort normal and breath sounds normal. No stridor. No respiratory distress. She has no wheezes. She has no rales. She exhibits no tenderness.   Good air entry bilateral lungs no rales no rhonchi no wheeze   Abdominal: Soft. Bowel sounds are normal. She exhibits no distension and no mass. There is no tenderness. There is no rebound and no guarding. Musculoskeletal: Normal range of motion.         General: No tenderness, deformity or edema.      Comments: Stiffness in fingers, DIP joints slightly tender in the fingers, Heberden nodules palpable     Lymphadenopathy:     She has no cervical adenopathy.   Neurological: She is alert and oriented to person, place, and time. She has normal reflexes. No cranial nerve deficit. She exhibits normal muscle tone. Coordination normal.   Gait normal no focal deficit peripheral neuropathy noted both hands and feet   Skin: Skin is dry. No rash noted. She is not diaphoretic. No erythema. No pallor.   Psychiatric: She has a normal mood and affect. Her behavior is normal. Judgment and thought content normal.   Nursing note and vitals reviewed.     Screening    ADL/iADL:   Activities of Daily Living (ADLs):   No difficulty bathing,   No  difficulty dressing,   No difficulty feeding oneself,   No difficulty going to the bathroom,  No  difficulty with mobility and urinary loss of control, but able to drive.   Independant Activities of Daily  Living (IADLs):  No  meal preparation help needed,  No  transportation help needed,   No needs help doing housework,   No needs help doing laundry,  No needs help managing medications,   No needs help managing money,   No needs help using the phone and needs help shopping.   Depression Screen:    he has not felt depressed or down and out over the past 2 months.     he has not had a loss of interest in things that normally brings pleasure.     he has not felt fatigued or had a loss of energy recently.   Fall Risk Screen:   Daily exercises balance exercises as discussed  Hearing Screen:   Indication: routine screening.   Mini-COG:   the clock face  Normal    Nbr of correct items recalled: 3/3  Mini-Cog Score: positive     Pain Screen:   Pain Scale:5 the patient has no pain.   Urinary Incontinence Screen:   Frequent Urination,   Leakage related to feeling of urgency, .   Vision & Speech:   Speech: Speech is normal.     Results/Data    No visits with results within 4 Week(s) from this visit.   Latest known visit with results is:   No results found for any previous visit.       Immunizations  As per patient all up-to-date         Assessment/ PLAN  1. Type 2 diabetes mellitus with diabetic polyneuropathy, with long-term current use of insulin (CMS/[facility])  Diabetic with polyneuropathy continue present medications exercises with each meal, check the labs and follow-up advised to see the diabetic clinic also  - CBC WITH DIFFERENTIAL; Future  - COMPREHENSIVE METABOLIC PANEL; Future  - GLYCOHEMOGLOBIN; Future  - LIPID PANEL WITH REFLEX; Future  - VITAMIN B12; Future  - MICROALBUMIN URINE RANDOM; Future  - RHEUMATOID FACTOR; Future  - CYCLIC CITRULLINATED PEPTIDE (CCP) AB (IGG); Future  - SEDIMENTATION RATE WESTERGREN; Future  - CYCLIC CITRULLINATED PEPTIDE (CCP) AB (IGG)    2. Neuropathy  Renewed gabapentin as advised exercises as recommended  - CBC WITH DIFFERENTIAL; Future  - COMPREHENSIVE METABOLIC PANEL; Future  -  GLYCOHEMOGLOBIN; Future  - LIPID PANEL WITH REFLEX; Future  - VITAMIN B12; Future  - MICROALBUMIN URINE RANDOM; Future  - RHEUMATOID FACTOR; Future  - CYCLIC CITRULLINATED PEPTIDE (CCP) AB (IGG); Future  - SEDIMENTATION RATE WESTERGREN; Future  - CYCLIC CITRULLINATED PEPTIDE (CCP) AB (IGG)    3. JAMAAL (obstructive sleep apnea)  Need reevaluation of CPAP will recommend after the labs next visit  - CBC WITH DIFFERENTIAL; Future  - COMPREHENSIVE METABOLIC PANEL; Future  - GLYCOHEMOGLOBIN; Future  - LIPID PANEL WITH REFLEX; Future  - VITAMIN B12; Future  - MICROALBUMIN URINE RANDOM; Future  - RHEUMATOID FACTOR; Future  - CYCLIC CITRULLINATED PEPTIDE (CCP) AB (IGG); Future  - SEDIMENTATION RATE WESTERGREN; Future  - CYCLIC CITRULLINATED PEPTIDE (CCP) AB (IGG)    4. Essential hypertension  Clinically stable continue present medications low-salt diet and exercises  - CBC WITH DIFFERENTIAL; Future  - COMPREHENSIVE METABOLIC PANEL; Future  - GLYCOHEMOGLOBIN; Future  - LIPID PANEL WITH REFLEX; Future  - VITAMIN B12; Future  - MICROALBUMIN URINE RANDOM; Future  - RHEUMATOID FACTOR; Future  - CYCLIC CITRULLINATED PEPTIDE (CCP) AB (IGG); Future  - SEDIMENTATION RATE WESTERGREN; Future  - CYCLIC CITRULLINATED PEPTIDE (CCP) AB (IGG)    5. Vitamin D deficiency  Continue vitamin D3 supplements as recommended  - CBC WITH DIFFERENTIAL; Future  - COMPREHENSIVE METABOLIC PANEL; Future  - GLYCOHEMOGLOBIN; Future  - LIPID PANEL WITH REFLEX; Future  - VITAMIN B12; Future  - MICROALBUMIN URINE RANDOM; Future  - RHEUMATOID FACTOR; Future  - CYCLIC CITRULLINATED PEPTIDE (CCP) AB (IGG); Future  - SEDIMENTATION RATE WESTERGREN; Future  - CYCLIC CITRULLINATED PEPTIDE (CCP) AB (IGG)    6. Mixed hyperlipidemia  Continue cholesterol medications check the labs LDL needs to be less than 100 as per guidelines  - CBC WITH DIFFERENTIAL; Future  - COMPREHENSIVE METABOLIC PANEL; Future  - GLYCOHEMOGLOBIN; Future  - LIPID PANEL WITH REFLEX; Future  - VITAMIN  B12; Future  - MICROALBUMIN URINE RANDOM; Future  - RHEUMATOID FACTOR; Future  - CYCLIC CITRULLINATED PEPTIDE (CCP) AB (IGG); Future  - SEDIMENTATION RATE WESTERGREN; Future  - CYCLIC CITRULLINATED PEPTIDE (CCP) AB (IGG)    Will refer to podiatry and ophthalmology next visit  Check for autoimmune diseases in view of the polyarthralgia and stiffness in hands   360 Assessment Plan:   Advance Care Planning Addressed: Advance Directives. Advance Care Planning discussed with patient.   Handouts (given to patient): Written handout given.   Health Maintenance (discussed and/or reviewed):   - Patient Education.    - Medication needs .    - Social Concerns.    - Patient does not require behavioral or case management referrals or other disease specific interventions.   Patient Education (taught and/or recommended):.   - Patient educated on disease process, etiology & prognosis.    - Proper usage and side effects of medications reviewed & discussed.    - Medical compliance with plan discussed and risks of non-compliance reviewed.    - Return to clinic as clinically indicated as discussed with patient who verbalized understanding of & agreement with the plan.     RETURN TO OFFICE  Return in about 4 weeks (around 2/25/2020).        Yusef Escalona MD     Patient is functionally independent, no skilled OT intervention is needed.

## 2020-03-02 NOTE — OCCUPATIONAL THERAPY INITIAL EVALUATION ADULT - PERTINENT HX OF CURRENT PROBLEM, REHAB EVAL
37 yo M s/p RTKR on 11/17/17 at [facility]. After discharge 11/22/17 he went ot [facility] ER for c/o hiccups, dyspnia worsening on exertion associated with chest pressure. PE workup  done and was negative, he was discharged home same day. His symptoms did not improved and on 11/23/17 he was admitted to  [facility] ICU care as per pt he stayed in the hospital for a few days and was discharged  home in good health without clear diagnosis. See below Location Indication Override (Is Already Calculated Based On Selected Body Location): Area M

## 2021-11-30 ENCOUNTER — APPOINTMENT (OUTPATIENT)
Dept: ORTHOPEDIC SURGERY | Facility: CLINIC | Age: 41
End: 2021-11-30
Payer: MEDICAID

## 2021-11-30 ENCOUNTER — NON-APPOINTMENT (OUTPATIENT)
Age: 41
End: 2021-11-30

## 2021-11-30 VITALS — BODY MASS INDEX: 39.28 KG/M2 | HEIGHT: 72 IN | WEIGHT: 290 LBS

## 2021-11-30 DIAGNOSIS — Z47.1 AFTERCARE FOLLOWING JOINT REPLACEMENT SURGERY: ICD-10-CM

## 2021-11-30 DIAGNOSIS — M51.37 OTHER INTERVERTEBRAL DISC DEGENERATION, LUMBOSACRAL REGION: ICD-10-CM

## 2021-11-30 DIAGNOSIS — M54.50 LOW BACK PAIN, UNSPECIFIED: ICD-10-CM

## 2021-11-30 DIAGNOSIS — Z96.652 AFTERCARE FOLLOWING JOINT REPLACEMENT SURGERY: ICD-10-CM

## 2021-11-30 PROCEDURE — 73564 X-RAY EXAM KNEE 4 OR MORE: CPT | Mod: LT

## 2021-11-30 PROCEDURE — 72100 X-RAY EXAM L-S SPINE 2/3 VWS: CPT

## 2021-11-30 PROCEDURE — 73522 X-RAY EXAM HIPS BI 3-4 VIEWS: CPT

## 2021-11-30 PROCEDURE — 20610 DRAIN/INJ JOINT/BURSA W/O US: CPT | Mod: 59,LT

## 2021-11-30 PROCEDURE — 99213 OFFICE O/P EST LOW 20 MIN: CPT | Mod: 25

## 2021-11-30 NOTE — HISTORY OF PRESENT ILLNESS
[de-identified] : Patient returns to office c/o left hip pain.\par He is s/p left TKR 2/18/19 by Dr. Hernandez as well as right TKR by Dr. Ren on 11/17/17.

## 2021-11-30 NOTE — HISTORY OF PRESENT ILLNESS
[de-identified] : Patient returns to office c/o left hip pain.\par He is s/p left TKR 2/18/19 by Dr. Hernandez as well as right TKR by Dr. Ren on 11/17/17.\par Both knees are doing very well at this time.\par He says his left hip pain has steadily increased over time, especially with prolonged sitting in a car.\par Pain is described as a sharp throbbing pain on the superolateral aspect of his hip.\par He has largely tolerated this pain and hasn't performed conservative treatment thus far.

## 2021-11-30 NOTE — DISCUSSION/SUMMARY
[de-identified] : Patient is doing very well with both of his knees.  His problem at this time appears to be related either to a degenerative L5S1 disc and radicular pain from his back because he does have a partial foot drop.  After giving the injections it appeared that the hip was injected first and he had some improvement but still pain over the trochanter following the trochanteric bursa injection he felt much better with ambulation and just moving his leg.  I feel therefore that his pain at this time is very likely due to some early arthritis in his hip and trochanteric bursitis.  Return visit in 3 to 6 months.

## 2021-11-30 NOTE — PHYSICAL EXAM
[de-identified] : Constitutional -  The patients gait is with a limp off his left leg at this time he says is because of the discomfort in his left hip more of the lateral hip but also radiating around his hip.  He also appears to have a mild decrease strength in partial foot drop of the left ankle.  He says he had pinched nerve in the past and has had weakness to dorsiflexion of his left foot since.  He has no pain in either knee.  His knees go from full extension to 120 degrees of flexion he has good medial lateral and posterior stability.  He did have an old osteotomy in his right knee.\par \par \par Spine - deep tendon reflexes are symmetric.  On today's exam again it is noted that he does have the partial foot drop on the left.  His sensation and motor function shows that he does not have any abnormality with sensation but does have some weakness to dorsiflexion of his left foot which she says is longstanding.\par \par \par \par \par HIP EXAMINATION\par \par Hip motion on today's exam shows flexion right hip 130 left hip 120, abduction right hip 60 left hip 50, adduction right hip 10 left hip 10 but he has discomfort when doing this on the left.  External rotation in both of his hips is approximately 25 degrees but internal rotation is 40 degrees.  This does show a slight difference from when he was originally seen in the past however it is hard to say whether it was in the examination or just the difference in how he is feeling today.  Also has some discomfort over his left greater trochanter.  \par \par KNEE EXAMINATION\par Right total knee replacement goes from 0-120 his left knee goes from 0-110.  He has pain in the medial joint line a varus alignment to his left knee he has medial lateral jog of motion but good endpoints.  He has crepitus behind his patella pain over his medial joint line and patella no Baker's cyst and a small effusion.\par \par \par \par Ankle and foot examination\par Of the ankle has normal motion.  There is normal ankle stability.  The patient has no major abnormalities of the foot.\par \par \par \par  [de-identified] : 4 views of his right and left knees continue to show his bilateral cemented total knee replacements the knees are well aligned satisfactory his left knee was done after he had a tibial osteotomy he said his left knee is more anatomic both appear to be satisfactory fixed and there is no evidence of osteolysis on the left on the right there is some decreased bone density which could be old remnant of the knee but there does not appear to be any subsidence this is in the tibia only.  The patella tracks well on both the right and left knees.\par \par An AP and lateral of the patient's the lumbar spine shows a slight list to the left and the lateral view does show severe degenerative changes at appears to be L5-S1 with some peripheral osteophyte formation around the vertebral bodies.\par \par An AP of the pelvis and lateral of the right left hip shows a right femoral head is round joint spaces maintained it should be noted by his and anatomy on the x-rays holding his legs and external rotation the left hip does show what appears to be slight narrowing of the joint space seen on the AP view on the lateral view however the head is still round and there is a joint space present.  Feel the patient probably has some early degenerative arthritis on the left hip but by his clinical examination is discomfort over his greater trochanter although is no calcium over the greater trochanter.  I think he also has trochanteric bursitis.

## 2021-11-30 NOTE — PROCEDURE
[de-identified] : Procedure Note: \par \par Anatomic Location: left  hip\par \par Diagnosis:  hip arthritis\par \par Procedure:  Injection of 6ccs of Marcaine 0.5% plain and Depo-Medrol 1cc (40 MG)\par \par Local Spray: Ethyl Chloride.\par \par Skin preparation with alcohol.\par \par Patient has consented for the procedure.\par \par Injection 22-gauge spinal needle  through an anterior  lateral approach.\par \par Patient tolerated the procedure well.\par \par \par \par Procedure Note: \par \par Anatomic Location: left  hip trochanteric bursitis\par \par Diagnosis: left  hip trochanteric bursitis\par \par Procedure:  Injection of 6ccs of Marcaine 0.5% plain and Kenolog 1cc \par \par Local Spray: Ethyl Chloride.\par \par Skin preparation with alcohol.\par \par Patient has consented for the procedure.\par \par Injection 22-gauge spinal needle  through a direct lateral approach.\par \par Patient tolerated the procedure well.\par \par Patient instructed to call the office if any reaction, fever, chills, increased erythema or swelling.   [phone].

## 2022-01-19 NOTE — H&P PST ADULT - PT NEEDS ASSIST
Patient Education       Fever in Children   The Basics   Written by the doctors and editors at [facility]   What is a fever? -- A fever is a rise in body temperature that goes above a certain level.  In general, a fever means a temperature above 100.4ºF (38ºC). You might get slightly different numbers depending on how you take your child's temperature - oral (mouth), armpit, ear, forehead, or rectal.  Armpit, ear, and forehead temperatures are easier to measure than rectal or oral temperatures, but they are not as accurate. Even so, the height of the temperature is less important than how sick your child seems to you. If you think your child has a fever, and they seem sick, your child's doctor or nurse might want you to double-check the temperature with an oral or rectal reading.  What is the best way to take my child's temperature? -- The most accurate way is to take a rectal temperature (figure 1).  Oral temperatures are also reliable when done in children who are least 4 years old. Here is the right way to take a temperature by mouth:  · Wait at least 30 minutes after your child has had anything hot or cold to eat or drink.  · Wash the thermometer with cool water and soap. Then rinse it.  · Place the tip of the thermometer under your child's tongue toward the back. Ask your child to hold the thermometer with their lips, not teeth.  · Have your child keep their lips sealed around the thermometer. A glass thermometer takes about 3 minutes to work. Most digital thermometers take less than 1 minute.  Armpit, ear (figure 2), and forehead temperatures are not as accurate as rectal or oral temperatures.  What causes fever? -- The most common cause of fever in children is infection. For example, children can get a fever if they have:  · A cold or the flu  · An airway infection, such as croup or bronchiolitis  · A stomach bug  In some cases, children get a fever after getting a vaccine.  Should I take my child to see a  doctor or nurse? -- You should take your child to a doctor or nurse if they are:  · Younger than 3 months and have a rectal temperature of 100.4ºF (38ºC) or higher. Your infant should see a doctor or nurse even if they look normal or seems fine. Do not give fever medicines to an infant younger than 3 months unless a doctor or nurse tells you to.  · Between 3 and 36 months and have a rectal temperature of 100.4ºF (38ºC) or higher for more than 3 days. Go right away if your child seems sick, is fussy or clingy, or refuses to drink fluids.  · Between 3 and 36 months old and have a rectal temperature of 102ºF (38.9ºC) or higher.  Children of any age should also see a doctor or nurse if they have:  · Oral, rectal, ear, or forehead temperature of 104ºF (40ºC) or higher  · Armpit temperature of 103ºF (39.4ºC) or higher  · A seizure caused by a fever  · Fevers that keep coming back (even if they last only a few hours)  · A fever as well as an ongoing medical problem, such as heart disease, cancer, lupus, or sickle cell anemia  · A fever as well as a new skin rash  What can I do to help my child feel better? -- You can:  · Offer your child lots of fluids to drink. Call the doctor or nurse if the child won't or can't drink fluids for more than a few hours.  · Encourage your child to rest as much as they want. But don't force them to sleep or rest. (Your child can go back to school or regular activities after they have had a normal temperature for 24 hours.)  Some parents give their children sponge baths to cool them down, but that is not usually necessary. Sometimes people think they can cool a child down by putting rubbing alcohol on their skin or adding it to a bath. But this is dangerous. Do not use any kind of alcohol to try to treat a fever.  How are fevers treated? -- That depends on what is causing the fever. Many children do not need treatment. Those who do might need:  · Antibiotics to fight the infection causing the  fever. But antibiotics work only on infections caused by bacteria, not on infections caused by viruses. For example, antibiotics will not work on a cold.  · Medicines, such as acetaminophen (sample brand name: Tylenol) or ibuprofen (sample brand names: Advil, Motrin) can help bring down a fever. But these medicines are not always necessary. For instance, a child older than 3 months who has a temperature of less than 102ºF (38.9ºC), and who is otherwise healthy and acting normally, does not need treatment.  If you do not know how best to handle your child's fever, call their nurse or doctor.  Never give aspirin to a child younger than 18 years old. Aspirin can cause a dangerous condition called Reye syndrome.  All topics are updated as new evidence becomes available and our peer review process is complete.  This topic retrieved from [employer] on: Sep 21, 2021.  Topic 92343 Version 15.0  Release: 29.4.2 - C29.263  © 2021 UpToDate, Inc. and/or its affiliates. All rights reserved.  figure 1: Measuring rectal temperature     Lay your child face down across your lap. Put a dab of petroleum jelly (sample brand name: Vaseline) on the end of the thermometer. Then gently insert the thermometer into the child's anus until the silver tip is not visible (1/4 to 1/2 inch [6 to 12 millimeters] inside the anus). Hold the thermometer in place. A glass thermometer takes about 2 minutes. Most digital thermometers need less than 1 minute.  Graphic 49087 Version 7.0    figure 2: Measuring ear temperature     To take an ear temperature, make sure you are using a thermometer meant for this. Pull your child's ear back before inserting the thermometer. Then hold the tip in your child's ear for about 2 seconds.  Graphic 56659 Version 6.0    Consumer Information Use and Disclaimer   This information is not specific medical advice and does not replace information you receive from your health care provider. This is only a brief summary of  general information. It does NOT include all information about conditions, illnesses, injuries, tests, procedures, treatments, therapies, discharge instructions or life-style choices that may apply to you. You must talk with your health care provider for complete information about your health and treatment options. This information should not be used to decide whether or not to accept your health care provider's advice, instructions or recommendations. Only your health care provider has the knowledge and training to provide advice that is right for you. The use of this information is governed by the [employer] End User License Agreement, available at https://www.[employer]/en/solutions/[employer]/about/jaswinder.The use of [employer] content is governed by the [employer] Terms of Use. ©2021 UpToDate, Inc. All rights reserved.  Copyright   © 2021 UpToDate, Inc. and/or its affiliates. All rights reserved.     no

## 2022-04-17 ENCOUNTER — TRANSCRIPTION ENCOUNTER (OUTPATIENT)
Age: 42
End: 2022-04-17

## 2023-09-15 ASSESSMENT — KOOS JR
RISING FROM SITTING: MILD
HOW SEVERE IS YOUR KNEE STIFFNESS AFTER FIRST WAKING IN MORNING: EXTREME
STANDING UPRIGHT: SEVERE
HOW SEVERE IS YOUR KNEE STIFFNESS AFTER FIRST WAKING IN MORNING: MILD
RISING FROM SITTING: SEVERE
GOING UP OR DOWN STAIRS: EXTREME
IMPORTED FORM: YES
HOW SEVERE IS YOUR KNEE STIFFNESS AFTER FIRST WAKING IN MORNING: EXTREME
RISING FROM SITTING: MILD
KOOS JR RAW SCORE: 5
BENDING TO THE FLOOR TO PICK UP OBJECT: MILD
BENDING TO THE FLOOR TO PICK UP OBJECT: EXTREME
IMPORTED FORM: YES
IMPORTED KOOS JR SCORE: 44.91
KOOS JR RAW SCORE: 25
IMPORTED KOOS JR SCORE: 44.91
STRAIGHTENING KNEE FULLY: SEVERE
GOING UP OR DOWN STAIRS: SEVERE
BENDING TO THE FLOOR TO PICK UP OBJECT: MILD
GOING UP OR DOWN STAIRS: SEVERE
KOOS JR RAW SCORE: 25
KOOS JR RAW SCORE: 17
IMPORTED KOOS JR SCORE: 73.34
GOING UP OR DOWN STAIRS: MODERATE
BENDING TO THE FLOOR TO PICK UP OBJECT: EXTREME
IMPORTED LATERALITY: LEFT
HOW SEVERE IS YOUR KNEE STIFFNESS AFTER FIRST WAKING IN MORNING: MILD
IMPORTED LATERALITY: LEFT
RISING FROM SITTING: SEVERE
BENDING TO THE FLOOR TO PICK UP OBJECT: SEVERE
IMPORTED KOOS JR SCORE: 20.94
KOOS JR RAW SCORE: 5
KOOS JR RAW SCORE: 17
STRAIGHTENING KNEE FULLY: SEVERE
TWISING OR PIVOTING ON KNEE: EXTREME
TWISING OR PIVOTING ON KNEE: MILD
TWISING OR PIVOTING ON KNEE: MILD
TWISING OR PIVOTING ON KNEE: EXTREME
IMPORTED KOOS JR SCORE: 20.94
IMPORTED KOOS JR SCORE: 73.34
BENDING TO THE FLOOR TO PICK UP OBJECT: SEVERE
GOING UP OR DOWN STAIRS: EXTREME
GOING UP OR DOWN STAIRS: MODERATE
STANDING UPRIGHT: SEVERE

## 2023-10-10 ENCOUNTER — APPOINTMENT (OUTPATIENT)
Dept: ORTHOPEDIC SURGERY | Facility: CLINIC | Age: 43
End: 2023-10-10
Payer: MEDICAID

## 2023-10-10 VITALS — HEIGHT: 72 IN | WEIGHT: 285 LBS | BODY MASS INDEX: 38.6 KG/M2

## 2023-10-10 DIAGNOSIS — Z96.651 PRESENCE OF RIGHT ARTIFICIAL KNEE JOINT: ICD-10-CM

## 2023-10-10 DIAGNOSIS — M16.12 UNILATERAL PRIMARY OSTEOARTHRITIS, LEFT HIP: ICD-10-CM

## 2023-10-10 DIAGNOSIS — Z96.652 PRESENCE OF LEFT ARTIFICIAL KNEE JOINT: ICD-10-CM

## 2023-10-10 DIAGNOSIS — M16.11 UNILATERAL PRIMARY OSTEOARTHRITIS, RIGHT HIP: ICD-10-CM

## 2023-10-10 DIAGNOSIS — M70.72 OTHER BURSITIS OF HIP, LEFT HIP: ICD-10-CM

## 2023-10-10 DIAGNOSIS — M54.16 RADICULOPATHY, LUMBAR REGION: ICD-10-CM

## 2023-10-10 PROCEDURE — 99214 OFFICE O/P EST MOD 30 MIN: CPT | Mod: 25

## 2023-10-10 PROCEDURE — 73564 X-RAY EXAM KNEE 4 OR MORE: CPT | Mod: 50

## 2023-10-10 PROCEDURE — 20610 DRAIN/INJ JOINT/BURSA W/O US: CPT | Mod: LT

## 2023-10-10 PROCEDURE — 72080 X-RAY EXAM THORACOLMB 2/> VW: CPT

## 2023-10-10 PROCEDURE — 73522 X-RAY EXAM HIPS BI 3-4 VIEWS: CPT

## 2023-10-10 RX ORDER — CELECOXIB 200 MG/1
200 CAPSULE ORAL DAILY
Qty: 30 | Refills: 1 | Status: ACTIVE | COMMUNITY
Start: 2023-10-10 | End: 1900-01-01

## 2023-11-13 ENCOUNTER — APPOINTMENT (OUTPATIENT)
Dept: ORTHOPEDIC SURGERY | Facility: CLINIC | Age: 43
End: 2023-11-13

## 2024-12-20 ENCOUNTER — APPOINTMENT (OUTPATIENT)
Dept: ORTHOPEDIC SURGERY | Facility: CLINIC | Age: 44
End: 2024-12-20
Payer: COMMERCIAL

## 2024-12-20 DIAGNOSIS — M54.16 RADICULOPATHY, LUMBAR REGION: ICD-10-CM

## 2024-12-20 DIAGNOSIS — M54.50 LOW BACK PAIN, UNSPECIFIED: ICD-10-CM

## 2024-12-20 PROCEDURE — 99204 OFFICE O/P NEW MOD 45 MIN: CPT

## 2024-12-30 ENCOUNTER — APPOINTMENT (OUTPATIENT)
Dept: MRI IMAGING | Facility: CLINIC | Age: 44
End: 2024-12-30

## 2025-01-07 ENCOUNTER — OUTPATIENT (OUTPATIENT)
Dept: OUTPATIENT SERVICES | Facility: HOSPITAL | Age: 45
LOS: 1 days | End: 2025-01-07

## 2025-01-07 ENCOUNTER — APPOINTMENT (OUTPATIENT)
Dept: MRI IMAGING | Facility: CLINIC | Age: 45
End: 2025-01-07
Payer: COMMERCIAL

## 2025-01-07 DIAGNOSIS — Z96.651 PRESENCE OF RIGHT ARTIFICIAL KNEE JOINT: Chronic | ICD-10-CM

## 2025-01-07 PROCEDURE — 72148 MRI LUMBAR SPINE W/O DYE: CPT | Mod: 26

## 2025-01-22 ENCOUNTER — APPOINTMENT (OUTPATIENT)
Dept: ORTHOPEDIC SURGERY | Facility: CLINIC | Age: 45
End: 2025-01-22
Payer: COMMERCIAL

## 2025-01-22 VITALS — BODY MASS INDEX: 36.57 KG/M2 | HEIGHT: 72 IN | WEIGHT: 270 LBS

## 2025-01-22 DIAGNOSIS — M54.16 RADICULOPATHY, LUMBAR REGION: ICD-10-CM

## 2025-01-22 DIAGNOSIS — M54.50 LOW BACK PAIN, UNSPECIFIED: ICD-10-CM

## 2025-01-22 DIAGNOSIS — M48.07 SPINAL STENOSIS, LUMBOSACRAL REGION: ICD-10-CM

## 2025-01-22 PROCEDURE — 99214 OFFICE O/P EST MOD 30 MIN: CPT

## 2025-03-05 ENCOUNTER — APPOINTMENT (OUTPATIENT)
Dept: ORTHOPEDIC SURGERY | Facility: CLINIC | Age: 45
End: 2025-03-05
